# Patient Record
Sex: FEMALE | Race: BLACK OR AFRICAN AMERICAN | NOT HISPANIC OR LATINO | Employment: FULL TIME | URBAN - METROPOLITAN AREA
[De-identification: names, ages, dates, MRNs, and addresses within clinical notes are randomized per-mention and may not be internally consistent; named-entity substitution may affect disease eponyms.]

---

## 2021-05-05 ENCOUNTER — HOSPITAL ENCOUNTER (EMERGENCY)
Facility: HOSPITAL | Age: 21
Discharge: HOME | End: 2021-05-05
Attending: EMERGENCY MEDICINE

## 2021-05-05 VITALS
WEIGHT: 150.25 LBS | SYSTOLIC BLOOD PRESSURE: 150 MMHG | DIASTOLIC BLOOD PRESSURE: 82 MMHG | OXYGEN SATURATION: 100 % | BODY MASS INDEX: 29.5 KG/M2 | HEIGHT: 60 IN | HEART RATE: 99 BPM | TEMPERATURE: 99 F | RESPIRATION RATE: 18 BRPM

## 2021-05-05 DIAGNOSIS — J04.0 LARYNGITIS: ICD-10-CM

## 2021-05-05 DIAGNOSIS — J06.9 VIRAL URI WITH COUGH: Primary | ICD-10-CM

## 2021-05-05 PROCEDURE — 99284 EMERGENCY DEPT VISIT MOD MDM: CPT

## 2021-05-05 RX ORDER — METHYLPREDNISOLONE 4 MG/1
TABLET ORAL
Qty: 1 PACKAGE | Refills: 0 | Status: SHIPPED | OUTPATIENT
Start: 2021-05-05 | End: 2021-05-26

## 2021-05-05 RX ORDER — GUAIFENESIN/DEXTROMETHORPHAN 100-10MG/5
5 SYRUP ORAL 4 TIMES DAILY PRN
Qty: 120 ML | Refills: 0 | Status: SHIPPED | OUTPATIENT
Start: 2021-05-05 | End: 2021-05-15

## 2022-08-19 ENCOUNTER — HOSPITAL ENCOUNTER (EMERGENCY)
Facility: HOSPITAL | Age: 22
Discharge: HOME | End: 2022-08-19
Attending: EMERGENCY MEDICINE

## 2022-08-19 VITALS
HEIGHT: 66 IN | HEART RATE: 69 BPM | SYSTOLIC BLOOD PRESSURE: 118 MMHG | WEIGHT: 140 LBS | OXYGEN SATURATION: 96 % | RESPIRATION RATE: 16 BRPM | BODY MASS INDEX: 22.5 KG/M2 | DIASTOLIC BLOOD PRESSURE: 61 MMHG | TEMPERATURE: 99 F

## 2022-08-19 DIAGNOSIS — S81.811A LACERATION OF RIGHT LOWER EXTREMITY, INITIAL ENCOUNTER: Primary | ICD-10-CM

## 2022-08-19 LAB
HCV AB SERPL QL IA: NEGATIVE
HEP C VIRUS HOLD SPECIMEN: NORMAL
HIV 1+2 AB+HIV1 P24 AG SERPL QL IA: NEGATIVE

## 2022-08-19 PROCEDURE — 25000003 PHARM REV CODE 250: Performed by: NURSE PRACTITIONER

## 2022-08-19 PROCEDURE — 90715 TDAP VACCINE 7 YRS/> IM: CPT | Performed by: NURSE PRACTITIONER

## 2022-08-19 PROCEDURE — 12002 RPR S/N/AX/GEN/TRNK2.6-7.5CM: CPT

## 2022-08-19 PROCEDURE — 99283 EMERGENCY DEPT VISIT LOW MDM: CPT | Mod: 25

## 2022-08-19 PROCEDURE — 63600175 PHARM REV CODE 636 W HCPCS: Performed by: NURSE PRACTITIONER

## 2022-08-19 PROCEDURE — 86803 HEPATITIS C AB TEST: CPT | Performed by: NURSE PRACTITIONER

## 2022-08-19 PROCEDURE — 87389 HIV-1 AG W/HIV-1&-2 AB AG IA: CPT | Performed by: NURSE PRACTITIONER

## 2022-08-19 PROCEDURE — 90471 IMMUNIZATION ADMIN: CPT | Performed by: NURSE PRACTITIONER

## 2022-08-19 RX ORDER — LIDOCAINE HYDROCHLORIDE 10 MG/ML
1 INJECTION, SOLUTION EPIDURAL; INFILTRATION; INTRACAUDAL; PERINEURAL
Status: COMPLETED | OUTPATIENT
Start: 2022-08-19 | End: 2022-08-19

## 2022-08-19 RX ADMIN — LIDOCAINE HYDROCHLORIDE 10 MG: 10 INJECTION, SOLUTION EPIDURAL; INFILTRATION; INTRACAUDAL; PERINEURAL at 12:08

## 2022-08-19 RX ADMIN — TETANUS TOXOID, REDUCED DIPHTHERIA TOXOID AND ACELLULAR PERTUSSIS VACCINE, ADSORBED 0.5 ML: 5; 2.5; 8; 8; 2.5 SUSPENSION INTRAMUSCULAR at 12:08

## 2022-08-19 NOTE — ED NOTES
4x4 gauze and ace bandage applied over stitches on right leg. Pt refused neosporin stating she would pick some up on the way home. Pt educated on keeping wound clean and dry and replacing bandage daily or when visibly soiled. Pt verbalized understanding, mother at bedside

## 2022-08-19 NOTE — ED PROVIDER NOTES
Encounter Date: 8/19/2022       History     Chief Complaint   Patient presents with    Laceration     Laceration to right upper leg. Pt reports she accidentally cut her leg at work with a knife while cutting a rug. Bleeding controlled.      Patient presents to ER for laceration to right upper leg, onset just prior to arrival.  Patient states she was cutting carpet with a clean knife (she reports changing to a new blade just prior to the incident).  She is unsure of her last tetanus vaccine.  She denies numbness, tingling.  Bleeding is controlled.      Laceration   The incident occurred just prior to arrival. The laceration is located on the right leg. The laceration mechanism was a a clean knife. The pain has been constant since onset. She reports no foreign bodies present. Her tetanus status is unknown.     Review of patient's allergies indicates:  No Known Allergies  No past medical history on file.  No past surgical history on file.  No family history on file.  Social History     Tobacco Use    Smoking status: Unknown If Ever Smoked   Substance Use Topics    Alcohol use: Not Currently    Drug use: Not Currently     Review of Systems   Constitutional: Negative for chills, fatigue and fever.   HENT: Negative for ear pain, rhinorrhea, sinus pain and sore throat.    Eyes: Negative for pain.   Respiratory: Negative for cough and shortness of breath.    Cardiovascular: Negative for chest pain and palpitations.   Gastrointestinal: Negative for abdominal pain, nausea and vomiting.   Genitourinary: Negative for dysuria.   Musculoskeletal: Negative for back pain, myalgias and neck pain.   Skin: Positive for wound. Negative for rash.   Neurological: Negative for weakness, numbness and headaches.   All other systems reviewed and are negative.      Physical Exam     Initial Vitals [08/19/22 1134]   BP Pulse Resp Temp SpO2   106/72 62 16 98.6 °F (37 °C) 95 %      MAP       --         Physical Exam    Nursing note and  vitals reviewed.  Constitutional: She appears well-developed and well-nourished. She is not diaphoretic. No distress.   HENT:   Head: Normocephalic and atraumatic.   Eyes: Conjunctivae and EOM are normal. Pupils are equal, round, and reactive to light.   Neck: Neck supple.   Normal range of motion.  Cardiovascular: Normal rate, regular rhythm and intact distal pulses.   Pulmonary/Chest: Breath sounds normal. No respiratory distress.   Musculoskeletal:         General: Normal range of motion.      Cervical back: Normal range of motion and neck supple.        Legs:      Neurological: She is alert and oriented to person, place, and time. She has normal strength. No sensory deficit. GCS score is 15. GCS eye subscore is 4. GCS verbal subscore is 5. GCS motor subscore is 6.   Skin: Skin is warm. Capillary refill takes less than 2 seconds.   + 4.5cm laceration to right lateral upper leg. No active bleeding. Distal sensation intact.         ED Course   Lac Repair    Date/Time: 8/19/2022 1:17 PM  Performed by: Deacon Flowers NP  Authorized by: Amor Joya MD     Consent:     Consent obtained:  Verbal    Consent given by:  Patient    Risks, benefits, and alternatives were discussed: yes      Risks discussed:  Infection, pain, poor cosmetic result and poor wound healing    Alternatives discussed:  No treatment  Anesthesia:     Anesthesia method:  Local infiltration    Local anesthetic:  Lidocaine 1% w/o epi  Laceration details:     Location:  Leg    Leg location:  R upper leg    Length (cm):  4.5  Pre-procedure details:     Preparation:  Patient was prepped and draped in usual sterile fashion  Exploration:     Wound exploration: entire depth of wound visualized      Wound extent: no foreign bodies/material noted      Contaminated: no    Treatment:     Area cleansed with:  Povidone-iodine and saline    Amount of cleaning:  Standard    Irrigation solution:  Sterile saline    Irrigation method:  Syringe  Skin repair:      Repair method:  Sutures    Suture size:  4-0    Suture material:  Nylon    Suture technique:  Simple interrupted    Number of sutures:  8  Approximation:     Approximation:  Close  Repair type:     Repair type:  Simple  Post-procedure details:     Dressing:  Antibiotic ointment and non-adherent dressing    Procedure completion:  Tolerated well, no immediate complications      Labs Reviewed   HIV 1 / 2 ANTIBODY    Narrative:     Release to patient->Immediate   HEPATITIS C ANTIBODY    Narrative:     Release to patient->Immediate   HEP C VIRUS HOLD SPECIMEN    Narrative:     Release to patient->Immediate          Imaging Results    None          Medications   neomycin-bacitracin-polymyxin ointment ( Topical (Top) Not Given 8/19/22 1200)   LIDOcaine (PF) 10 mg/ml (1%) injection 10 mg (10 mg Other Given by Provider 8/19/22 1200)   Tdap (BOOSTRIX) vaccine injection 0.5 mL (0.5 mLs Intramuscular Given 8/19/22 1200)       patient tolerated laceration repair without complication.  Instructed patient to follow-up with PCP for suture removal in 8-10 days.  Discussed proper wound care at home.  Discussed signs and symptoms to return to ER.  Patient verbalizes understanding and agrees with plan.  Patient states comfortable discharge home.  Regarding LACERATION CARE, patient was instructed to wash hands with soap and warm water before and after caring for wound; keep the wound dry for the first 24 to 48 hours and then gently clean the wound once or twice a day with cool water using soap to clean around the wound; avoid using alcohol or hydrogen peroxide to clean wound unless directed to; and use bandages to keep wound clean and protected and to prevent swelling.  Advised patient to contact primary healthcare provider if wound splits open; becomes extremely painful; appears to not be healing; has a foreign object present; develop a purulent discharge; or note the skin around the wound becoming numb, edematous, or erythematous.   Patient instructed to follow up with primary care provider for wound re-check or closure removal in 8-10 days.                      Clinical Impression:   Final diagnoses:  [S82.460S] Laceration of right lower extremity, initial encounter (Primary)          ED Disposition Condition    Discharge Stable        ED Prescriptions     Medication Sig Dispense Start Date End Date Auth. Provider    neomycin-polymyxin-pramoxine (NEOSPORIN) 3.5-10,000-10 mg-unit-mg/gram Crea Apply topically 2 (two) times daily. for 10 days 1 each 8/19/2022 8/29/2022 Deacon Flowers NP        Follow-up Information     Follow up With Specialties Details Why Contact Info    Follow-up with your PCP for suture removal in 8-10 days.   For suture removal     [facility] - Emergency Dept. Emergency Medicine  If symptoms worsen, As needed [address] [facility] [address] [phone]           Deacon Flowers NP  08/19/22 2909

## 2022-08-29 ENCOUNTER — HOSPITAL ENCOUNTER (EMERGENCY)
Facility: HOSPITAL | Age: 22
Discharge: HOME | End: 2022-08-29
Attending: EMERGENCY MEDICINE

## 2022-08-29 VITALS
RESPIRATION RATE: 15 BRPM | HEART RATE: 86 BPM | DIASTOLIC BLOOD PRESSURE: 63 MMHG | SYSTOLIC BLOOD PRESSURE: 127 MMHG | TEMPERATURE: 98 F | WEIGHT: 131.38 LBS | BODY MASS INDEX: 21.21 KG/M2 | OXYGEN SATURATION: 98 %

## 2022-08-29 DIAGNOSIS — S81.811D LACERATION OF RIGHT LOWER EXTREMITY, SUBSEQUENT ENCOUNTER: Primary | ICD-10-CM

## 2022-08-29 PROCEDURE — 99281 EMR DPT VST MAYX REQ PHY/QHP: CPT

## 2022-08-29 NOTE — Clinical Note
"Radha "Mikayla Desouza was seen and treated in our emergency department on 8/29/2022.  She may return to work on 09/05/2022.       If you have any questions or concerns, please don't hesitate to call.      Syd Walker MD"

## 2022-08-29 NOTE — ED PROVIDER NOTES
SCRIBE #1 NOTE: I, Hector Varner, am scribing for, and in the presence of, Syd Walker MD. I have scribed the entire note.      History      Chief Complaint   Patient presents with    Suture / Staple Removal     Received stitches on 8/19/22. Wants to know if they can be taken out today. No complaints.        Review of patient's allergies indicates:  No Known Allergies     HPI   HPI    8/29/2022, 10:23 AM   History obtained from the patient      History of Present Illness: Radha Desouza is a 22 y.o. female patient who presents to the Emergency Department for suture removal. Pt had 8 sutures placed on 8/19/22 to repair a RLE laceration. Pt denies any complaints at this time. No mitigating or exacerbating factors reported. No associated sxs reported. Patient denies any fever, chills, n/v/d, SOB, CP, weakness, numbness, dizziness, headache, and all other sxs at this time. No further complaints or concerns at this time.     Arrival mode: Personal vehicle    PCP: Primary Doctor No       Past Medical History:  No past medical history on file.    Past Surgical History:  No past surgical history on file.      Family History:  No family history on file.    Social History:  Social History     Tobacco Use    Smoking status: Unknown    Smokeless tobacco: Not on file   Substance and Sexual Activity    Alcohol use: Not Currently    Drug use: Not Currently    Sexual activity: Not on file       ROS   Review of Systems   Constitutional:  Negative for chills and fever.   HENT:  Negative for sore throat.    Respiratory:  Negative for shortness of breath.    Cardiovascular:  Negative for chest pain.   Gastrointestinal:  Negative for diarrhea, nausea and vomiting.   Genitourinary:  Negative for dysuria.   Musculoskeletal:  Negative for back pain.   Skin:  Negative for rash.   Neurological:  Negative for dizziness, weakness, light-headedness, numbness and headaches.   Hematological:  Does not bruise/bleed easily.   All other  systems reviewed and are negative.    Physical Exam      Initial Vitals   BP Pulse Resp Temp SpO2   08/29/22 1007 08/29/22 1007 08/29/22 1007 08/29/22 1008 08/29/22 1007   127/63 86 15 98.4 °F (36.9 °C) 98 %      MAP       --                 Physical Exam  Nursing Notes and Vital Signs Reviewed.  Constitutional: Patient is in no acute distress. Well-developed and well-nourished.  Head: Atraumatic. Normocephalic.  Eyes: PERRL. EOM intact. Conjunctivae are not pale. No scleral icterus.  ENT: Mucous membranes are moist. Oropharynx is clear and symmetric.    Neck: Supple. Full ROM.   Cardiovascular: Regular rate. Regular rhythm. No murmurs, rubs, or gallops. Distal pulses are 2+ and symmetric.  Pulmonary/Chest: No respiratory distress. Clear to auscultation bilaterally. No wheezing or rales.  Abdominal: Soft and non-distended.  There is no tenderness.  No rebound, guarding, or rigidity.   Musculoskeletal: Moves all extremities. No obvious deformities. No edema.  Skin: Warm and dry. Well-healed laceration to the R upper leg, with sutures in place.  Neurological:  Alert, awake, and appropriate.  Normal speech.  No acute focal neurological deficits are appreciated.  Psychiatric: Normal affect. Good eye contact. Appropriate in content.    ED Course    Suture Removal    Date/Time: 8/29/2022 10:25 AM  Location procedure was performed: [facility] EMERGENCY DEPARTMENT  Performed by: Syd Walker MD  Authorized by: Syd Walker MD   Body area: lower extremity  Location details: right upper leg  Wound Appearance: clean, well healed, nontender and no drainage  Sutures Removed: 8  Post-removal: Steri-Strips applied  Facility: sutures placed in this facility  Complications: No  Specimens: No  Implants: No  Patient tolerance: Patient tolerated the procedure well with no immediate complications      ED Vital Signs:  Vitals:    08/29/22 1007 08/29/22 1008   BP: 127/63    Pulse: 86    Resp: 15    Temp:  98.4 °F (36.9 °C)   SpO2:  98%    Weight: 59.6 kg (131 lb 6.3 oz)        Abnormal Lab Results:  Labs Reviewed - No data to display     Imaging Results:  Imaging Results    None                 The Emergency Provider reviewed the vital signs and test results, which are outlined above.    ED Discussion     10:26 AM: Reassessed pt at this time. Discussed with pt all pertinent ED information. Discussed pt dx and plan of tx. Gave pt all f/u and return to the ED instructions. All questions and concerns were addressed at this time. Pt expresses understanding of information and instructions, and is comfortable with plan to discharge. Pt is stable for discharge.    I discussed with patient and/or family/caretaker that evaluation in the ED does not suggest any emergent or life threatening medical conditions requiring immediate intervention beyond what was provided in the ED, and I believe patient is safe for discharge.  Regardless, an unremarkable evaluation in the ED does not preclude the development or presence of a serious of life threatening condition. As such, patient was instructed to return immediately for any worsening or change in current symptoms.         ED Medication(s):  Medications - No data to display     Follow-up Information       [facility] In 2 days.    Contact information:  [address] [facility] [address] [phone]                           Discharge Medication List as of 8/29/2022 10:24 AM            Medical Decision Making              Scribe Attestation:   Scribe #1: I performed the above scribed service and the documentation accurately describes the services I performed. I attest to the accuracy of the note.    Attending:   Physician Attestation Statement for Scribe #1: I, Syd Walker MD, personally performed the services described in this documentation, as scribed by Hector Varner, in my presence, and it is both accurate and complete.          Clinical Impression       ICD-10-CM ICD-9-CM   1.  Laceration of right lower extremity, subsequent encounter  S81.811D V58.89     894.0       Disposition:   Disposition: Discharged  Condition: Stable       Syd Walker MD  08/29/22 1143

## 2022-11-28 ENCOUNTER — OFFICE VISIT (OUTPATIENT)
Dept: URGENT CARE | Facility: CLINIC | Age: 22
End: 2022-11-28

## 2022-11-28 VITALS
RESPIRATION RATE: 18 BRPM | SYSTOLIC BLOOD PRESSURE: 118 MMHG | OXYGEN SATURATION: 98 % | HEART RATE: 76 BPM | BODY MASS INDEX: 21.05 KG/M2 | DIASTOLIC BLOOD PRESSURE: 64 MMHG | WEIGHT: 131 LBS | TEMPERATURE: 98 F | HEIGHT: 66 IN

## 2022-11-28 DIAGNOSIS — J02.9 SORE THROAT: ICD-10-CM

## 2022-11-28 DIAGNOSIS — J06.9 VIRAL URI WITH COUGH: Primary | ICD-10-CM

## 2022-11-28 LAB
CTP QC/QA: YES
POC MOLECULAR INFLUENZA A AGN: NEGATIVE
POC MOLECULAR INFLUENZA B AGN: NEGATIVE

## 2022-11-28 PROCEDURE — 87502 POCT INFLUENZA A/B MOLECULAR: ICD-10-PCS | Mod: QW,TIER,S$GLB, | Performed by: NURSE PRACTITIONER

## 2022-11-28 PROCEDURE — 99203 PR OFFICE/OUTPT VISIT, NEW, LEVL III, 30-44 MIN: ICD-10-PCS | Mod: TIER,S$GLB,, | Performed by: NURSE PRACTITIONER

## 2022-11-28 PROCEDURE — 87502 INFLUENZA DNA AMP PROBE: CPT | Mod: QW,TIER,S$GLB, | Performed by: NURSE PRACTITIONER

## 2022-11-28 PROCEDURE — 99203 OFFICE O/P NEW LOW 30 MIN: CPT | Mod: TIER,S$GLB,, | Performed by: NURSE PRACTITIONER

## 2022-11-28 NOTE — LETTER
November 28, 2022      [facility]  [address], [address]  [place] [address]  Phone: [phone]  Fax: [phone]       Patient: Radha Desouza   YOB: 2000  Date of Visit: 11/28/2022    To Whom It May Concern:    Nadege Desuoza  was at [facility] on 11/28/2022. The patient may return to work/school on 11/29/2022 with no restrictions. If you have any questions or concerns, or if I can be of further assistance, please do not hesitate to contact me.    Sincerely,        Oliver Duran, NP

## 2022-11-29 NOTE — PROGRESS NOTES
"Subjective:       Patient ID: Radha Desouza is a 22 y.o. female.    Vitals:  height is 5' 6" (1.676 m) and weight is 59.4 kg (131 lb). Her tympanic temperature is 98.2 °F (36.8 °C). Her blood pressure is 118/64 and her pulse is 76. Her respiration is 18 and oxygen saturation is 98%.     Chief Complaint: Sore Throat    Pt presents today with sore throat and cough. Pt states that her symptoms started 2-3 days ago. Pt states that she has taken OTC medication for her symptoms.    Sore Throat   This is a new problem. The current episode started in the past 7 days. The problem has been gradually worsening. There has been no fever. The pain is at a severity of 4/10. The pain is moderate. Associated symptoms include congestion and swollen glands. Pertinent negatives include no abdominal pain, coughing, diarrhea, drooling, ear discharge, ear pain, headaches, hoarse voice, plugged ear sensation, neck pain, shortness of breath, stridor, trouble swallowing or vomiting. She has tried acetaminophen (oral decongestants) for the symptoms. The treatment provided mild relief.     HENT:  Positive for congestion and sore throat. Negative for ear pain, ear discharge, drooling and trouble swallowing.    Neck: Negative for neck pain.   Respiratory:  Negative for cough, shortness of breath and stridor.    Gastrointestinal:  Negative for abdominal pain, vomiting and diarrhea.   Neurological:  Negative for headaches.     Objective:      Physical Exam   Constitutional: She is oriented to person, place, and time. She appears well-developed. She is cooperative.  Non-toxic appearance. She does not appear ill. No distress.   HENT:   Head: Normocephalic and atraumatic.   Ears:   Right Ear: Hearing, tympanic membrane, external ear and ear canal normal. No middle ear effusion.   Left Ear: Hearing, tympanic membrane, external ear and ear canal normal.  No middle ear effusion.   Nose: Rhinorrhea present. No mucosal edema or nasal deformity. No " epistaxis. Right sinus exhibits no maxillary sinus tenderness and no frontal sinus tenderness. Left sinus exhibits no maxillary sinus tenderness and no frontal sinus tenderness.   Mouth/Throat: Uvula is midline, oropharynx is clear and moist and mucous membranes are normal. No trismus in the jaw. Normal dentition. No uvula swelling. Cobblestoning present. No oropharyngeal exudate, posterior oropharyngeal edema or posterior oropharyngeal erythema.   Eyes: Conjunctivae and lids are normal. No scleral icterus.   Neck: Trachea normal and phonation normal. Neck supple. No edema present. No erythema present. No neck rigidity present.   Cardiovascular: Normal rate, regular rhythm, normal heart sounds and normal pulses.   Pulmonary/Chest: Effort normal and breath sounds normal. No respiratory distress. She has no decreased breath sounds. She has no wheezes. She has no rhonchi.   Abdominal: Normal appearance.   Musculoskeletal: Normal range of motion.         General: No deformity. Normal range of motion.   Neurological: She is alert and oriented to person, place, and time. She exhibits normal muscle tone. Coordination normal.   Skin: Skin is warm, dry, intact, not diaphoretic and not pale.   Psychiatric: Her speech is normal and behavior is normal. Judgment and thought content normal.   Nursing note and vitals reviewed.      Assessment:       1. Viral URI with cough    2. Sore throat            Plan:         Viral URI with cough  -     POCT Influenza A/B MOLECULAR    Sore throat               Results for orders placed or performed in visit on 11/28/22   POCT Influenza A/B MOLECULAR   Result Value Ref Range    POC Molecular Influenza A Ag Negative Negative, Not Reported    POC Molecular Influenza B Ag Negative Negative, Not Reported     Acceptable Yes      Lab result reviewed and discussed with patient.    Get plenty of rest.  Increase fluids.   May apply warm compresses as needed for facial pain and  congestion.   Saline nasal spray to loosen nasal congestion.  Humidifier or steamy shower may help with congestion.  Flonase or Nasacort to reduce inflammation in the sinus cavities.  You may take an over the counter 24 hour antihistamine such as Zyrtec or Claritin for allergy symptoms such as sneezing, itchy/watery eyes, scratchy throat, or congestion.  Warm salt water gargles, Cepacol throat lozenges, or Chloraseptic spray for sore throat.  Take Tylenol or Ibuprofen as needed for sore throat, body aches, or fever.  Take an over the counter cough suppressant such as Delsym or Robitussin DM as directed on label for cough.  You may take a multi-symptom medication in the place of cough suppressant such as Dayquil/Nyquil or Advil Cold and Flu as directed on label.  Follow up with your primary care provider if symptoms persist >10 days or sooner for any new or worsening symptoms.   Go to the ER for any fever that does not improve with Tylenol/Ibuprofen, neck stiffness, rash, severe headache, vision changes, shortness of breath, chest pain, facial swelling, severe facial pain, or any other new and concerning symptoms.

## 2022-11-29 NOTE — PATIENT INSTRUCTIONS
Get plenty of rest.  Increase fluids.   May apply warm compresses as needed for facial pain and congestion.   Saline nasal spray to loosen nasal congestion.  Humidifier or steamy shower may help with congestion.  Flonase or Nasacort to reduce inflammation in the sinus cavities.  You may take an over the counter 24 hour antihistamine such as Zyrtec or Claritin for allergy symptoms such as sneezing, itchy/watery eyes, scratchy throat, or congestion.  Warm salt water gargles, Cepacol throat lozenges, or Chloraseptic spray for sore throat.  Take Tylenol or Ibuprofen as needed for sore throat, body aches, or fever.  Take an over the counter cough suppressant such as Delsym or Robitussin DM as directed on label for cough.  You may take a multi-symptom medication in the place of cough suppressant such as Dayquil/Nyquil or Advil Cold and Flu as directed on label.  Follow up with your primary care provider if symptoms persist >10 days or sooner for any new or worsening symptoms.   Go to the ER for any fever that does not improve with Tylenol/Ibuprofen, neck stiffness, rash, severe headache, vision changes, shortness of breath, chest pain, facial swelling, severe facial pain, or any other new and concerning symptoms.

## 2022-12-04 ENCOUNTER — HOSPITAL ENCOUNTER (EMERGENCY)
Facility: HOSPITAL | Age: 22
Discharge: HOME | End: 2022-12-05
Attending: EMERGENCY MEDICINE

## 2022-12-04 VITALS
WEIGHT: 138.88 LBS | SYSTOLIC BLOOD PRESSURE: 125 MMHG | HEIGHT: 66 IN | DIASTOLIC BLOOD PRESSURE: 90 MMHG | HEART RATE: 102 BPM | OXYGEN SATURATION: 100 % | TEMPERATURE: 99 F | RESPIRATION RATE: 20 BRPM | BODY MASS INDEX: 22.32 KG/M2

## 2022-12-04 DIAGNOSIS — R05.9 COUGH: ICD-10-CM

## 2022-12-04 DIAGNOSIS — J20.9 ACUTE BRONCHITIS, UNSPECIFIED ORGANISM: Primary | ICD-10-CM

## 2022-12-04 LAB
INFLUENZA A, MOLECULAR: NEGATIVE
INFLUENZA B, MOLECULAR: NEGATIVE
SARS-COV-2 RDRP RESP QL NAA+PROBE: NEGATIVE
SPECIMEN SOURCE: NORMAL

## 2022-12-04 PROCEDURE — 25000003 PHARM REV CODE 250: Performed by: NURSE PRACTITIONER

## 2022-12-04 PROCEDURE — U0002 COVID-19 LAB TEST NON-CDC: HCPCS | Performed by: EMERGENCY MEDICINE

## 2022-12-04 PROCEDURE — 87502 INFLUENZA DNA AMP PROBE: CPT | Performed by: EMERGENCY MEDICINE

## 2022-12-04 RX ORDER — DEXAMETHASONE SODIUM PHOSPHATE 4 MG/ML
10 INJECTION, SOLUTION INTRA-ARTICULAR; INTRALESIONAL; INTRAMUSCULAR; INTRAVENOUS; SOFT TISSUE
Status: COMPLETED | OUTPATIENT
Start: 2022-12-05 | End: 2022-12-05

## 2022-12-04 RX ORDER — PROMETHAZINE HYDROCHLORIDE AND DEXTROMETHORPHAN HYDROBROMIDE 6.25; 15 MG/5ML; MG/5ML
5 SYRUP ORAL 3 TIMES DAILY PRN
Qty: 180 ML | Refills: 0 | Status: SHIPPED | OUTPATIENT
Start: 2022-12-04 | End: 2022-12-14

## 2022-12-04 RX ORDER — PROMETHAZINE HYDROCHLORIDE AND CODEINE PHOSPHATE 6.25; 1 MG/5ML; MG/5ML
5 SOLUTION ORAL
Status: COMPLETED | OUTPATIENT
Start: 2022-12-04 | End: 2022-12-04

## 2022-12-04 RX ADMIN — PROMETHAZINE HYDROCHLORIDE AND CODEINE PHOSPHATE 5 ML: 6.25; 1 SOLUTION ORAL at 11:12

## 2022-12-05 PROCEDURE — 99284 EMERGENCY DEPT VISIT MOD MDM: CPT

## 2022-12-05 PROCEDURE — 63600175 PHARM REV CODE 636 W HCPCS: Performed by: NURSE PRACTITIONER

## 2022-12-05 PROCEDURE — 96372 THER/PROPH/DIAG INJ SC/IM: CPT | Performed by: NURSE PRACTITIONER

## 2022-12-05 RX ADMIN — DEXAMETHASONE SODIUM PHOSPHATE 10 MG: 4 INJECTION INTRA-ARTICULAR; INTRALESIONAL; INTRAMUSCULAR; INTRAVENOUS; SOFT TISSUE at 12:12

## 2022-12-05 NOTE — ED PROVIDER NOTES
HISTORY     Chief Complaint   Patient presents with    Cough     Pt presents to ED [facility] [place] persistant cough (non-productive). Seen at  on 11/27 and DX URI. Pt on no RX meds. Denies any other flu like S/S     Review of patient's allergies indicates:  No Known Allergies     HPI   The history is provided by the patient and a parent.   Cough  This is a new problem. The current episode started several days ago. The problem occurs constantly. The problem has been gradually worsening. The cough is Non-productive. Pertinent negatives include no chest pain, no sore throat and no shortness of breath. The treatment provided no relief. She is not a smoker.      PCP: Primary Doctor No     Past Medical History:  No past medical history on file.     Past Surgical History:  No past surgical history on file.     Family History:  No family history on file.     Social History:  Social History     Tobacco Use    Smoking status: Unknown    Smokeless tobacco: Not on file   Substance and Sexual Activity    Alcohol use: Not Currently    Drug use: Not Currently    Sexual activity: Not Currently         ROS   Review of Systems   Constitutional:  Negative for fever.   HENT:  Negative for sore throat.    Respiratory:  Positive for cough. Negative for shortness of breath.    Cardiovascular:  Negative for chest pain.   Gastrointestinal:  Negative for nausea.   Genitourinary:  Negative for dysuria.   Musculoskeletal:  Negative for back pain.   Skin:  Negative for rash.   Neurological:  Negative for weakness.   Hematological:  Does not bruise/bleed easily.     PHYSICAL EXAM     Initial Vitals [12/04/22 2236]   BP Pulse Resp Temp SpO2   (!) 125/90 102 18 98.5 °F (36.9 °C) 100 %      MAP       --           Physical Exam    Constitutional: She appears well-developed and well-nourished. No distress.   HENT:   Head: Normocephalic and atraumatic.   Eyes: Conjunctivae are normal. Pupils are equal, round, and reactive to light.   Neck: Neck  "supple.   Normal range of motion.  Cardiovascular:  Normal rate, regular rhythm and normal heart sounds.           Pulmonary/Chest: Breath sounds normal.   Dry cough   Abdominal: Abdomen is soft. Bowel sounds are normal. She exhibits no distension. There is no abdominal tenderness. There is no rebound.   Musculoskeletal:         General: No edema. Normal range of motion.      Cervical back: Normal range of motion and neck supple.     Neurological: She is alert and oriented to person, place, and time. She has normal strength.   Skin: Skin is warm and dry.   Psychiatric: She has a normal mood and affect.        ED COURSE   Procedures  ED ONGOING VITALS:  Vitals:    12/04/22 2236 12/04/22 2342   BP: (!) 125/90    Pulse: 102    Resp: 18 20   Temp: 98.5 °F (36.9 °C)    TempSrc: Oral    SpO2: 100%    Weight: 63 kg (138 lb 14.2 oz)    Height: 5' 6" (1.676 m)          ABNORMAL LAB VALUES:  Labs Reviewed   INFLUENZA A & B BY MOLECULAR   SARS-COV-2 RNA AMPLIFICATION, QUAL         ALL LAB VALUES:  Results for orders placed or performed during the hospital encounter of 12/04/22   Influenza A & B by Molecular    Specimen: Nasopharyngeal Swab   Result Value Ref Range    Influenza A, Molecular Negative Negative    Influenza B, Molecular Negative Negative    Flu A & B Source Nasal swab    COVID-19 Rapid Screening   Result Value Ref Range    SARS-CoV-2 RNA, Amplification, Qual Negative Negative           RADIOLOGY STUDIES:  Imaging Results              X-Ray Chest 1 View (Final result)  Result time 12/04/22 23:31:40      Final result by Chase Lazcano MD (12/04/22 23:31:40)                   Impression:      No acute abnormality.      Electronically signed by: Chase Lazcano  Date:    12/04/2022  Time:    23:31               Narrative:    EXAMINATION:  XR CHEST 1 VIEW    CLINICAL HISTORY:  Cough, unspecified    TECHNIQUE:  Single frontal view of the chest was performed.    COMPARISON:  None    FINDINGS:  The lungs are clear, with " normal appearance of pulmonary vasculature and no pleural effusion or pneumothorax.    The cardiac silhouette is normal in size. The hilar and mediastinal contours are unremarkable.    Bones are intact.                                                The above vital signs and test results have been reviewed by the emergency provider.     ED Medications:  Discharge Medication List as of 12/4/2022 11:57 PM        START taking these medications    Details   promethazine-dextromethorphan (PROMETHAZINE-DM) 6.25-15 mg/5 mL Syrp Take 5 mLs by mouth 3 (three) times daily as needed., Starting Sun 12/4/2022, Until Wed 12/14/2022 at 2359, Print           Discharge Medications:  Discharge Medication List as of 12/4/2022 11:57 PM        START taking these medications    Details   promethazine-dextromethorphan (PROMETHAZINE-DM) 6.25-15 mg/5 mL Syrp Take 5 mLs by mouth 3 (three) times daily as needed., Starting Sun 12/4/2022, Until Wed 12/14/2022 at 2359, Print             Follow-up Information       Schedule an appointment as soon as possible for a visit  with PCP.               [facility] - Emergency Dept..    Specialty: Emergency Medicine  Contact information:  [id number] [place] [id number] [phone]                          I discussed with patient and/or family/caretaker that evaluation in the ED does not suggest any emergent or life threatening medical conditions requiring immediate intervention beyond what was provided in the ED, and I believe patient is safe for discharge. Regardless, an unremarkable evaluation in the ED does not preclude the development or presence of a serious or life threatening condition. As such, patient was instructed to return immediately for any worsening or change in current symptoms.    Regarding BRONCHITIS, for treatment, I encouraged patient to refrain from smoking, drink plenty of fluids, rest, take medications as prescribed, and to use a humidifier or steam in  the bathroom. Patient instructed to notify primary care provider if: they have a cough most days or have a cough that returns frequently; are coughing up blood; have a high fever or shaking chills; have a low-grade fever for three or more days; develop thick, greenish mucus, especially if it has a bad smell; and feel short of breath or have chest pain. For prevention, discussed with patient the importance of not smoking, getting annual influenza vaccines, reducing exposure to air pollution, and to frequently wash hands to avoid spread of infection.  Instructed patient to return to emergency department if they experience chest pain or shortness of breath and to follow up with primary care provider for re-evaluation.        MEDICAL DECISION MAKING                 CLINICAL IMPRESSION       ICD-10-CM ICD-9-CM   1. Acute bronchitis, unspecified organism  J20.9 466.0   2. Cough  R05.9 786.2       Disposition:   Disposition: Discharged  Condition: Stable       Eduard Stein NP  12/05/22 1400

## 2022-12-14 ENCOUNTER — HOSPITAL ENCOUNTER (EMERGENCY)
Facility: HOSPITAL | Age: 22
Discharge: HOME | End: 2022-12-14
Attending: FAMILY MEDICINE

## 2022-12-14 VITALS
WEIGHT: 138.56 LBS | BODY MASS INDEX: 22.36 KG/M2 | TEMPERATURE: 98 F | RESPIRATION RATE: 18 BRPM | DIASTOLIC BLOOD PRESSURE: 75 MMHG | OXYGEN SATURATION: 98 % | HEART RATE: 70 BPM | SYSTOLIC BLOOD PRESSURE: 140 MMHG

## 2022-12-14 DIAGNOSIS — R10.9 ABDOMINAL CRAMPING: Primary | ICD-10-CM

## 2022-12-14 LAB
B-HCG UR QL: NEGATIVE
BILIRUB UR QL STRIP: NEGATIVE
CLARITY UR: CLEAR
COLOR UR: YELLOW
GLUCOSE UR QL STRIP: NEGATIVE
HGB UR QL STRIP: NEGATIVE
KETONES UR QL STRIP: NEGATIVE
LEUKOCYTE ESTERASE UR QL STRIP: ABNORMAL
MICROSCOPIC COMMENT: NORMAL
NITRITE UR QL STRIP: NEGATIVE
PH UR STRIP: 7 [PH] (ref 5–8)
PROT UR QL STRIP: NEGATIVE
RBC #/AREA URNS HPF: 3 /HPF (ref 0–4)
SP GR UR STRIP: 1.01 (ref 1–1.03)
SQUAMOUS #/AREA URNS HPF: 1 /HPF
URN SPEC COLLECT METH UR: ABNORMAL
UROBILINOGEN UR STRIP-ACNC: NEGATIVE EU/DL
WBC #/AREA URNS HPF: 1 /HPF (ref 0–5)
WBC CLUMPS URNS QL MICRO: NORMAL

## 2022-12-14 PROCEDURE — 99282 EMERGENCY DEPT VISIT SF MDM: CPT | Mod: 25

## 2022-12-14 PROCEDURE — 81025 URINE PREGNANCY TEST: CPT | Performed by: REGISTERED NURSE

## 2022-12-14 PROCEDURE — 81000 URINALYSIS NONAUTO W/SCOPE: CPT | Performed by: REGISTERED NURSE

## 2022-12-14 NOTE — ED PROVIDER NOTES
Encounter Date: 12/14/2022       History     Chief Complaint   Patient presents with    Abdominal Cramping     Lower abd. Cramping a few days ago and spotting today. Concerns for a UTI or pregnancy from pt.      22-year-old female presents emergency department with complaints of lower abdominal cramping and vaginal spotting that began today.  Patient concerned she may have UTI or pregnant.  Patient denies any fever, chills, pelvic pain, dysuria or any other symptoms at this time.    The history is provided by the patient.   Review of patient's allergies indicates:  No Known Allergies  No past medical history on file.  No past surgical history on file.  No family history on file.  Social History     Tobacco Use    Smoking status: Unknown   Substance Use Topics    Alcohol use: Not Currently    Drug use: Not Currently     Review of Systems   Constitutional:  Negative for fever.   HENT:  Negative for sore throat.    Respiratory:  Negative for shortness of breath.    Cardiovascular:  Negative for chest pain.   Gastrointestinal:  Negative for nausea.        +abdominal cramping   Genitourinary:  Positive for vaginal bleeding. Negative for dysuria.   Musculoskeletal:  Negative for back pain.   Skin:  Negative for rash.   Neurological:  Negative for weakness.   Hematological:  Does not bruise/bleed easily.   All other systems reviewed and are negative.    Physical Exam     Initial Vitals [12/14/22 1717]   BP Pulse Resp Temp SpO2   (!) 146/86 71 18 98.7 °F (37.1 °C) 98 %      MAP       --         Physical Exam    Constitutional: She appears well-developed and well-nourished. She is not diaphoretic. No distress.   HENT:   Head: Normocephalic and atraumatic.   Eyes: Conjunctivae and EOM are normal. Pupils are equal, round, and reactive to light.   Neck: Neck supple.   Normal range of motion.  Cardiovascular:  Normal rate, regular rhythm and normal heart sounds.           No murmur heard.  Pulmonary/Chest: Breath sounds normal.  No respiratory distress. She has no wheezes. She has no rales.   Abdominal: Abdomen is soft. Bowel sounds are normal. There is no abdominal tenderness. There is no rebound and no guarding.   Musculoskeletal:         General: No tenderness or edema. Normal range of motion.      Cervical back: Normal range of motion and neck supple.     Neurological: She is alert and oriented to person, place, and time. No cranial nerve deficit. GCS score is 15. GCS eye subscore is 4. GCS verbal subscore is 5. GCS motor subscore is 6.   Skin: Skin is warm and dry. Capillary refill takes less than 2 seconds.   Psychiatric: She has a normal mood and affect. Thought content normal.       ED Course   Procedures  Labs Reviewed   URINALYSIS, REFLEX TO URINE CULTURE - Abnormal; Notable for the following components:       Result Value    Leukocytes, UA Trace (*)     All other components within normal limits    Narrative:     Specimen Source->Urine   PREGNANCY TEST, URINE RAPID    Narrative:     Specimen Source->Urine   URINALYSIS MICROSCOPIC    Narrative:     Specimen Source->Urine   HIV 1 / 2 ANTIBODY   HEPATITIS C ANTIBODY   HEP C VIRUS HOLD SPECIMEN          Imaging Results    None          Medications - No data to display                           Clinical Impression:   Final diagnoses:  [R10.9] Abdominal cramping (Primary)        ED Disposition Condition    Discharge Stable          ED Prescriptions    None       Follow-up Information       Follow up With Specialties Details Why Contact Info    [facility] - Emergency Dept. Emergency Medicine  As needed [id number] [facility] [id number] [phone]             Jeffery Sow Jr., FNP  12/14/22 5234

## 2022-12-20 ENCOUNTER — PATIENT OUTREACH (OUTPATIENT)
Dept: EMERGENCY MEDICINE | Facility: HOSPITAL | Age: 22
End: 2022-12-20

## 2022-12-20 NOTE — PROGRESS NOTES
Tracy Saucedo  ED Navigator  Emergency Department    Project: [facility] ED Navigator  Role: Community Health Worker    Date: 12/20/2022  Patient Name: Radha Desouza  MRN: [id number]  PCP: Primary Doctor No    Assessment:     Radha Desouza is a 22 y.o. female who has presented to ED for abdominal cramping. Patient has visited the ED 2 times in the past 3 months. Patient did not contact PCP.     ED Navigator Initial Assessment    ED Navigator Enrollment Documentation  Consent to Services  Does patient consent to completing the assessment?: Yes  Contact  Method of Initial Contact: Phone  Transportation  Does the patient have issues with Transportation?: No  Does the patient have transportation to and from healthcare appointments?: Yes  Insurance Coverage  Do you have coverage/adequate coverage?: No  Reason for no/inadequate coverage: Other (see comment) (Comment: Pt applied for insurance and new policy information has not arrived /BC/BS)  Specialist Appointment  Did the patient come to the ED to see a specialist?: No  Does the patient have a pending specialist referral?: No  Does the patient have a specialist appointment made?: No  PCP Follow Up Appointment  Has the patient had an appointment with a primary care provider in the past year?: No  Does the patient have a follow up appontment with a PCP?: No  When was the last time you saw your PCP?: 12/20/21  Why does the patient not have a follow up scheduled?: No established [facility]/outside PCP  Would you like an [facility] PCP?: Yes  Medications  Is patient able to afford medication?: Yes  Is patient unable to get medication due to lack of transportation?: No  Psychological  Does the patient have psycho-social concerns?: Yes  What concerns does the patient have?: Anxiety and/or Depression, Other (see comments) (Comment: Stress/Anxiety)  Food  Does the patient have concerns about food?: No  Communication/Education  Does the patient have limited English proficiency/English  not primary language?: No  Does patient have low literacy and/or low health literacy?: Yes (Comment: Low Health Literacy/No established PCP/No healthcare coverage until recently)  Does patient have concerns with care?: No  Does patient have dissatisfaction with care?: No  Other Financial Concerns  Does the patient have immediate financial distress?: No  Does the patient have general financial concerns?: No  Other Social Barriers/Concerns  Does the patient have any additional barriers or concerns?: Other (see comments) (Comment: Stress/Anxiety-Recently applied for Health insurance/has not received ID cards)  Primary Barrier  Barriers identified: Structural barrier (service availability, waiting times, etc.), Cognitive barrier (health literacy, language and communication, etc.), Psychological barrier (mistrust, anxiety, etc.) (Comment: Stress/Anxiety- Pt has not had health insurance coverage/Recently applied for Health insurance/has not received ID cards/will establish PCP when insurance card is received)  Root Cause of ED Utilization: Lack of Access to Primary Care  Plan to address Lack of Access to Primary Care: Provided information for [facility] 24/7 Nurse Triage line [phone] or 1-866-[facility] ([phone])  Next steps: Provided Education (Comment: Providing Medicaid application information, stress management)  Was education/educational materials provided surrounding PCP services/creating a medical home?: Yes Was education verbal or written?: Written     Was education/educational materials provided surrounding low cost, healthy foods?: Yes Was education verbal or written?: Written     Was education/educational materials provided surrounding other items? If so, use comment to explain.: Yes (Comment: Medicaid application information/stress management) Was education verbal or written?: Written   Plan: Provided information for [facility] 24/7 Nurse triage line, [phone] or 1-[facility]  ([phone])  Expected Date of Follow Up 1: 12/28/22  Additional Documentation: I spoke with patient today regarding 12/14/22 ED visit for abdominal cramping. Pt was unable to schedule a follow up appt with a PCP because she was lacking insurance coverage. Pt stated that she did apply for coverage with BC/BS, was approved and is waiting for her insurance ID cards. Pt also said that she was told she qualified for Medicaid, but has not applied.  Pt stated that she does not currently experiences any food or financial difficulties. Pt said that she is experiencing some stress and anxiety, but that it is manageable at this time. Pt said that she exercises regularly and has a great support system.  Pt does not have any additional resource needs at this time. I told patient that I would follow up with her next week to see if her insurance id cards have arrived and to schedule her an appt with a pcp to establish care.  Pt was provided resource information for Medicaid application process, stress management, along with Right Care Right Place form, [facility] Virtual Visit flyer, [facility] 24/[facility]#, [facility] Nurse Triage #, and Healthy Heart diet educational information.         Social History     Socioeconomic History    Marital status: Single   Tobacco Use    Smoking status: Unknown   Substance and Sexual Activity    Alcohol use: Not Currently    Drug use: Not Currently    Sexual activity: Not Currently     Social Determinants of Health     Financial Resource Strain: Low Risk     Difficulty of Paying Living Expenses: Not hard at all   Food Insecurity: No Food Insecurity    Worried About Running Out of Food in the Last Year: Never true    Ran Out of Food in the Last Year: Never true   Transportation Needs: No Transportation Needs    Lack of Transportation (Medical): No    Lack of Transportation (Non-Medical): No   Physical Activity: Sufficiently Active    Days of Exercise per Week: 3 days    Minutes of Exercise per Session:  60 min   Stress: Stress Concern Present    Feeling of Stress : To some extent   Social Connections: Socially Isolated    Frequency of Communication with Friends and Family: Three times a week    Frequency of Social Gatherings with Friends and Family: Three times a week    Attends [employer] Services: Never    Active Member of Clubs or Organizations: No    Attends Club or Organization Meetings: Never    Marital Status: Never    Housing Stability: Unknown    Unable to Pay for Housing in the Last Year: No    Unstable Housing in the Last Year: No       Plan:   I spoke with patient today regarding 12/14/22 ED visit for abdominal cramping. Pt was unable to schedule a follow up appt with a PCP because she was lacking insurance coverage. Pt stated that she did apply for coverage with BC/BS, was approved and is waiting for her insurance ID cards. Pt also said that she was told she qualified for Medicaid, but has not applied.  Pt stated that she does not currently experiences any food or financial difficulties. Pt said that she is experiencing some stress and anxiety, but that it is manageable at this time. Pt said that she exercises regularly and has a great support system.  Pt does not have any additional resource needs at this time. I told patient that I would follow up with her next week to see if her insurance id cards have arrived and to schedule her an appt with a pcp to establish care.  Pt was provided resource information for Medicaid application process, stress management, along with Right Care Right Place form, [facility] Virtual Visit flyer, [facility] 24[facility]#, [facility] Nurse Triage #, and Healthy Heart diet educational information.

## 2022-12-28 ENCOUNTER — PATIENT OUTREACH (OUTPATIENT)
Dept: EMERGENCY MEDICINE | Facility: HOSPITAL | Age: 22
End: 2022-12-28

## 2022-12-28 NOTE — PROGRESS NOTES
I spoke with patient to follow up on information provided for the Medicaid application process. Pt received the information and is in the process of applying.  Pt received new BC/BS insurance cards and will provide information at next follow up. Pt was at work and could not have a long detailed conversation. Pt works in [place] and gets off of work at 3:30, usually arriving home around 4:15.  I will contact patient for additional follow up on 12/29/22 after 4:15 p.m.

## 2023-01-25 ENCOUNTER — PATIENT OUTREACH (OUTPATIENT)
Dept: EMERGENCY MEDICINE | Facility: HOSPITAL | Age: 23
End: 2023-01-25
Payer: COMMERCIAL

## 2023-01-25 NOTE — PROGRESS NOTES
I contacted pt for second follow up to confirm that pt received a response from Medicaid on her insurance. Pt was provided Medicaid application information and pt applied. Pt also purchased insurance on her own until Medicaid policy is effective. I requested that pt respond by e-mail  or phone to let me know if she received her medicaid insurance cards.  I will follow up with pt in 4 weeks if pt has not responded.    Tracy Saucedo

## 2023-02-22 ENCOUNTER — PATIENT OUTREACH (OUTPATIENT)
Dept: EMERGENCY MEDICINE | Facility: HOSPITAL | Age: 23
End: 2023-02-22
Payer: COMMERCIAL

## 2023-04-07 ENCOUNTER — HOSPITAL ENCOUNTER (EMERGENCY)
Facility: HOSPITAL | Age: 23
Discharge: HOME | End: 2023-04-07
Attending: EMERGENCY MEDICINE
Payer: COMMERCIAL

## 2023-04-07 VITALS
RESPIRATION RATE: 20 BRPM | DIASTOLIC BLOOD PRESSURE: 84 MMHG | OXYGEN SATURATION: 100 % | WEIGHT: 132.5 LBS | HEART RATE: 106 BPM | SYSTOLIC BLOOD PRESSURE: 147 MMHG | BODY MASS INDEX: 21.29 KG/M2 | TEMPERATURE: 99 F | HEIGHT: 66 IN

## 2023-04-07 DIAGNOSIS — M54.50 ACUTE LEFT-SIDED LOW BACK PAIN WITHOUT SCIATICA: ICD-10-CM

## 2023-04-07 DIAGNOSIS — N39.0 URINARY TRACT INFECTION WITHOUT HEMATURIA, SITE UNSPECIFIED: ICD-10-CM

## 2023-04-07 DIAGNOSIS — N12 PYELONEPHRITIS: Primary | ICD-10-CM

## 2023-04-07 LAB
B-HCG UR QL: NEGATIVE
BACTERIA #/AREA URNS HPF: ABNORMAL /HPF
BILIRUB UR QL STRIP: NEGATIVE
CLARITY UR: ABNORMAL
COLOR UR: YELLOW
GLUCOSE UR QL STRIP: NEGATIVE
HGB UR QL STRIP: ABNORMAL
KETONES UR QL STRIP: ABNORMAL
LEUKOCYTE ESTERASE UR QL STRIP: ABNORMAL
MICROSCOPIC COMMENT: ABNORMAL
NITRITE UR QL STRIP: NEGATIVE
PH UR STRIP: 6 [PH] (ref 5–8)
PROT UR QL STRIP: ABNORMAL
RBC #/AREA URNS HPF: 1 /HPF (ref 0–4)
SP GR UR STRIP: 1.01 (ref 1–1.03)
URN SPEC COLLECT METH UR: ABNORMAL
UROBILINOGEN UR STRIP-ACNC: NEGATIVE EU/DL
WBC #/AREA URNS HPF: 96 /HPF (ref 0–5)
WBC CLUMPS URNS QL MICRO: ABNORMAL

## 2023-04-07 PROCEDURE — 81025 URINE PREGNANCY TEST: CPT | Performed by: EMERGENCY MEDICINE

## 2023-04-07 PROCEDURE — 87088 URINE BACTERIA CULTURE: CPT | Performed by: EMERGENCY MEDICINE

## 2023-04-07 PROCEDURE — 25000003 PHARM REV CODE 250: Performed by: EMERGENCY MEDICINE

## 2023-04-07 PROCEDURE — 87186 SC STD MICRODIL/AGAR DIL: CPT | Performed by: EMERGENCY MEDICINE

## 2023-04-07 PROCEDURE — 96372 THER/PROPH/DIAG INJ SC/IM: CPT | Performed by: EMERGENCY MEDICINE

## 2023-04-07 PROCEDURE — 87077 CULTURE AEROBIC IDENTIFY: CPT | Performed by: EMERGENCY MEDICINE

## 2023-04-07 PROCEDURE — 87086 URINE CULTURE/COLONY COUNT: CPT | Performed by: EMERGENCY MEDICINE

## 2023-04-07 PROCEDURE — 63600175 PHARM REV CODE 636 W HCPCS: Performed by: EMERGENCY MEDICINE

## 2023-04-07 PROCEDURE — 81000 URINALYSIS NONAUTO W/SCOPE: CPT | Performed by: EMERGENCY MEDICINE

## 2023-04-07 PROCEDURE — 99285 EMERGENCY DEPT VISIT HI MDM: CPT | Mod: 25

## 2023-04-07 RX ORDER — HYDROCODONE BITARTRATE AND ACETAMINOPHEN 10; 325 MG/1; MG/1
1 TABLET ORAL
Status: COMPLETED | OUTPATIENT
Start: 2023-04-07 | End: 2023-04-07

## 2023-04-07 RX ORDER — NAPROXEN 500 MG/1
500 TABLET ORAL 2 TIMES DAILY WITH MEALS
Qty: 20 TABLET | Refills: 0 | Status: CANCELLED | OUTPATIENT
Start: 2023-04-07

## 2023-04-07 RX ORDER — CYCLOBENZAPRINE HCL 10 MG
10 TABLET ORAL 3 TIMES DAILY PRN
Qty: 9 TABLET | Refills: 0 | Status: SHIPPED | OUTPATIENT
Start: 2023-04-07 | End: 2023-04-12

## 2023-04-07 RX ORDER — HYDROCODONE BITARTRATE AND ACETAMINOPHEN 5; 325 MG/1; MG/1
1 TABLET ORAL EVERY 6 HOURS PRN
Qty: 12 TABLET | Refills: 0 | Status: SHIPPED | OUTPATIENT
Start: 2023-04-07 | End: 2023-04-21

## 2023-04-07 RX ORDER — TRAMADOL HYDROCHLORIDE 50 MG/1
50 TABLET ORAL EVERY 6 HOURS PRN
Qty: 12 TABLET | Refills: 0 | Status: CANCELLED | OUTPATIENT
Start: 2023-04-07

## 2023-04-07 RX ORDER — KETOROLAC TROMETHAMINE 10 MG/1
10 TABLET, FILM COATED ORAL EVERY 6 HOURS
Qty: 15 TABLET | Refills: 0 | Status: SHIPPED | OUTPATIENT
Start: 2023-04-07 | End: 2023-04-21

## 2023-04-07 RX ORDER — CYCLOBENZAPRINE HCL 10 MG
10 TABLET ORAL 3 TIMES DAILY PRN
Qty: 9 TABLET | Refills: 0 | Status: CANCELLED | OUTPATIENT
Start: 2023-04-07 | End: 2023-04-12

## 2023-04-07 RX ORDER — ORPHENADRINE CITRATE 30 MG/ML
60 INJECTION INTRAMUSCULAR; INTRAVENOUS
Status: COMPLETED | OUTPATIENT
Start: 2023-04-07 | End: 2023-04-07

## 2023-04-07 RX ORDER — NITROFURANTOIN 25; 75 MG/1; MG/1
100 CAPSULE ORAL 2 TIMES DAILY
Qty: 10 CAPSULE | Refills: 0 | Status: CANCELLED | OUTPATIENT
Start: 2023-04-07 | End: 2023-04-12

## 2023-04-07 RX ORDER — LEVOFLOXACIN 750 MG/1
750 TABLET ORAL DAILY
Qty: 5 TABLET | Refills: 0 | Status: SHIPPED | OUTPATIENT
Start: 2023-04-07 | End: 2023-04-21

## 2023-04-07 RX ADMIN — ORPHENADRINE CITRATE 60 MG: 30 INJECTION INTRAMUSCULAR; INTRAVENOUS at 06:04

## 2023-04-07 RX ADMIN — HYDROCODONE BITARTRATE AND ACETAMINOPHEN 1 TABLET: 10; 325 TABLET ORAL at 12:04

## 2023-04-07 RX ADMIN — HYDROCODONE BITARTRATE AND ACETAMINOPHEN 1 TABLET: 10; 325 TABLET ORAL at 06:04

## 2023-04-07 NOTE — ED PROVIDER NOTES
SCRIBE #1 NOTE: I, Akil Dupont, am scribing for, and in the presence of, No att. providers found. I have scribed the entire note.       History     Chief Complaint   Patient presents with    Back Pain     Pt reports pain to L spine x1 day. Began after lifting. Denies dysuria.     Review of patient's allergies indicates:  No Known Allergies      History of Present Illness     HPI    4/7/2023, 8:02 AM  History obtained from the patient      History of Present Illness: Radha Desouza is a 22 y.o. female patient who presents to the Emergency Department for evaluation of lower left back pain which onset yesterday at 5am. Pt reports pain began after lifting at work. Symptoms are constant and moderate in severity. No mitigating or exacerbating factors reported. No Associated sxs. Patient denies any fever, chills, congestion, n/v/d, dysuria, SOB, and all other sxs at this time. No Prior Tx. No further complaints or concerns at this time.       Arrival mode: Personal vehicle      PCP: Primary Doctor No        Past Medical History:  History reviewed. No pertinent past medical history.    Past Surgical History:  History reviewed. No pertinent surgical history.      Family History:  Family History   Problem Relation Age of Onset    No Known Problems Mother        Social History:  Social History     Tobacco Use    Smoking status: Never    Smokeless tobacco: Never   Substance and Sexual Activity    Alcohol use: Not Currently    Drug use: Never    Sexual activity: Not Currently        Review of Systems     Review of Systems   Constitutional:  Negative for chills and fever.   HENT:  Negative for congestion and sore throat.    Respiratory:  Negative for shortness of breath.    Cardiovascular:  Negative for chest pain.   Gastrointestinal:  Negative for diarrhea, nausea and vomiting.   Genitourinary:  Negative for dysuria.   Musculoskeletal:  Positive for back pain.   Skin:  Negative for rash.   Neurological:  Negative for weakness.  "  Hematological:  Does not bruise/bleed easily.   All other systems reviewed and are negative.     Physical Exam     Initial Vitals [04/07/23 0558]   BP Pulse Resp Temp SpO2   125/79 (!) 122 18 99.8 °F (37.7 °C) 97 %      MAP       --          Physical Exam  Nursing Notes and Vital Signs Reviewed.  Constitutional: Patient is in no acute distress. Well-developed and well-nourished.  Head: Atraumatic. Normocephalic.  Eyes: PERRL. EOM intact. Conjunctivae are not pale. No scleral icterus.  ENT: Mucous membranes are moist. Oropharynx is clear and symmetric.    Neck: Supple. Full ROM. No lymphadenopathy.  Cardiovascular: Regular rate. Regular rhythm. No murmurs, rubs, or gallops. Distal pulses are 2+ and symmetric.  Pulmonary/Chest: No respiratory distress. Clear to auscultation bilaterally. No wheezing or rales.  Abdominal: Soft and non-distended.  There is no tenderness.  No rebound, guarding, or rigidity. Good bowel sounds.  Genitourinary: No CVA tenderness  Musculoskeletal: Moves all extremities. No obvious deformities. No edema. No calf tenderness. Tenderness to left spinal muscle. Worst than the right side.  No CVAT  Skin: Warm and dry.  Neurological:  Alert, awake, and appropriate.  Normal speech.  No acute focal neurological deficits are appreciated.  Psychiatric: Normal affect. Good eye contact. Appropriate in content.     ED Course   Procedures  ED Vital Signs:  Vitals:    04/07/23 0558 04/07/23 0630 04/07/23 0633 04/07/23 0700   BP: 125/79  138/72 127/72   Pulse: (!) 122  (!) 111 85   Resp: 18 20  16   Temp: 99.8 °F (37.7 °C)   99.8 °F (37.7 °C)   TempSrc: Oral   Oral   SpO2: 97%  100%    Weight: 60.1 kg (132 lb 7.9 oz)      Height: 5' 6" (1.676 m)       04/07/23 0800 04/07/23 0830 04/07/23 1130 04/07/23 1202   BP: 133/75 118/71 (!) 142/106    Pulse: 81 77 88    Resp: 16 16 19 20   Temp:       TempSrc:       SpO2: 99% 100% 100%    Weight:       Height:        04/07/23 1204   BP: (!) 147/84   Pulse: 106   Resp: "    Temp: 99.4 °F (37.4 °C)   TempSrc: Oral   SpO2: 100%   Weight:    Height:        Abnormal Lab Results:  Labs Reviewed   CULTURE, URINE - Abnormal; Notable for the following components:       Result Value    Urine Culture, Routine   (*)     Value: GRAM NEGATIVE JODI  >100,000 cfu/ml  Identification and susceptibility pending      All other components within normal limits    Narrative:     Specimen Source->Urine   URINALYSIS, REFLEX TO URINE CULTURE - Abnormal; Notable for the following components:    Appearance, UA Hazy (*)     Protein, UA Trace (*)     Ketones, UA 2+ (*)     Occult Blood UA Trace (*)     Leukocytes, UA 3+ (*)     All other components within normal limits    Narrative:     Specimen Source->Urine   URINALYSIS MICROSCOPIC - Abnormal; Notable for the following components:    WBC, UA 96 (*)     WBC Clumps, UA Moderate (*)     Bacteria Many (*)     All other components within normal limits    Narrative:     Specimen Source->Urine   PREGNANCY TEST, URINE RAPID    Narrative:     Specimen Source->Urine        All Lab Results:  Results for orders placed or performed during the hospital encounter of 04/07/23   Urine culture    Specimen: Urine   Result Value Ref Range    Urine Culture, Routine (A)      GRAM NEGATIVE JODI  >100,000 cfu/ml  Identification and susceptibility pending     Urinalysis, Reflex to Urine Culture Urine, Clean Catch    Specimen: Urine   Result Value Ref Range    Specimen UA Urine, Clean Catch     Color, UA Yellow Yellow, Straw, Patricia    Appearance, UA Hazy (A) Clear    pH, UA 6.0 5.0 - 8.0    Specific Gravity, UA 1.010 1.005 - 1.030    Protein, UA Trace (A) Negative    Glucose, UA Negative Negative    Ketones, UA 2+ (A) Negative    Bilirubin (UA) Negative Negative    Occult Blood UA Trace (A) Negative    Nitrite, UA Negative Negative    Urobilinogen, UA Negative <2.0 EU/dL    Leukocytes, UA 3+ (A) Negative   Rapid Pregnancy, Urine   Result Value Ref Range    Preg Test, Ur Negative     Urinalysis Microscopic   Result Value Ref Range    RBC, UA 1 0 - 4 /hpf    WBC, UA 96 (H) 0 - 5 /hpf    WBC Clumps, UA Moderate (A) None-Rare    Bacteria Many (A) None-Occ /hpf    Microscopic Comment SEE COMMENT         Imaging Results:  Imaging Results              CT Renal Stone Study ABD Pelvis WO (Final result)  Result time 04/07/23 09:04:31      Final result by Zander Dejesus MD (04/07/23 09:04:31)                   Impression:      Mild left-sided perinephric stranding and Lavonne ureteral stranding as well as mild nonspecific bladder wall thickening could reflect UTI or recent stone passage.    All CT scans at this facility use dose modulation, iterative reconstruction, and/or weight based dosing when appropriate to reduce radiation dose to as low as reasonable achievable.      Electronically signed by: Zander Dejesus MD  Date:    04/07/2023  Time:    09:04               Narrative:    EXAMINATION:  CT RENAL STONE STUDY ABD PELVIS WO    CLINICAL HISTORY:  Flank pain, kidney stone suspected;    TECHNIQUE:  Low dose axial images, sagittal and coronal reformations were obtained from the lung bases to the pubic symphysis.  Oral contrast was not administered.    COMPARISON:  None    FINDINGS:  Heart: Normal size. No effusion.    Lung Bases: Clear.    Liver: Normal size and attenuation. No focal lesions.    Gallbladder: No calcified gallstones.    Bile Ducts: No dilatation.    Pancreas: No obvious mass. No peripancreatic fat stranding.    Spleen: Normal.    Adrenals: Normal.    Kidneys/Ureters: Normal size.  Mild left-sided perinephric stranding and Lavonne ureteral stranding as well as mild nonspecific bladder wall thickening could reflect UTI or recent stone passage.  No radiopaque stone is identified.  Right kidney is unremarkable.    Bladder: No wall thickening.    Reproductive organs: Normal.    GI Tract/Mesentery: No evidence of bowel obstruction or inflammation.  Mild constipation.  No evidence of  appendicitis.    Peritoneal Space: No ascites or free air.    Retroperitoneum: No significant adenopathy.    Abdominal wall: Small fat containing umbilical hernia.    Vasculature: No aneurysm.    Bones: No acute fracture. No suspicious lytic or sclerotic lesions.                                       X-Ray Lumbar Spine Ap And Lateral (Final result)  Result time 04/07/23 08:08:12      Final result by Carter Monaco MD (04/07/23 08:08:12)                   Impression:      No acute abnormality.  Additional findings as above.      Electronically signed by: Carter Monaco  Date:    04/07/2023  Time:    08:08               Narrative:    EXAMINATION:  XR LUMBAR SPINE AP AND LATERAL    CLINICAL HISTORY:  low back pain;    TECHNIQUE:  AP, lateral and spot images were performed of the lumbar spine.    COMPARISON:  None    FINDINGS:  No acute fracture or suspicious osseous lesion evident.  Mild straightening of the normal lumbar lordosis without evidence of traumatic malalignment.  Satisfactory disc heights.  No significant degenerative changes are evident.  Incidentally visualized structures appear intact.                                                The Emergency Provider reviewed the vital signs and test results, which are outlined above.     ED Discussion         11:47 AM: Reassessed pt at this time. Discussed with pt all pertinent ED information and results. Discussed pt dx and plan of tx. Gave pt all f/u and return to the ED instructions. All questions and concerns were addressed at this time. Pt expresses understanding of information and instructions, and is comfortable with plan to discharge. Pt is stable for discharge.    I discussed with patient and/or family/caretaker that evaluation in the ED does not suggest any emergent or life threatening medical conditions requiring immediate intervention beyond what was provided in the ED, and I believe patient is safe for discharge.  Regardless, an unremarkable  evaluation in the ED does not preclude the development or presence of a serious of life threatening condition. As such, patient was instructed to return immediately for any worsening or change in current symptoms.       Medical Decision Making  Problems Addressed:  Acute left-sided low back pain without sciatica: acute illness or injury that poses a threat to life or bodily functions  Pyelonephritis: undiagnosed new problem with uncertain prognosis    Amount and/or Complexity of Data Reviewed  Independent Historian:      Details: Boyfriend relates that pt could not get any relief at home  Labs: ordered. Decision-making details documented in ED Course.     Details: + UTI  Radiology: ordered and independent interpretation performed.     Details: CT Renal Stone: No evidence of kidney stone  X-Ray Lumbar Spine: No acute abnormality.    Risk  Prescription drug management.  Parenteral controlled substances.  Decision regarding hospitalization.  Risk Details: Pt appropriate for outpt management c pain control and abx.  Pt and boyfriend agree c tx plan at this time                    ED Medication(s):  Medications   HYDROcodone-acetaminophen  mg per tablet 1 tablet (1 tablet Oral Given 4/7/23 0630)   orphenadrine injection 60 mg (60 mg Intramuscular Given 4/7/23 0631)   HYDROcodone-acetaminophen  mg per tablet 1 tablet (1 tablet Oral Given 4/7/23 1202)       Discharge Medication List as of 4/7/2023 11:50 AM        START taking these medications    Details   cyclobenzaprine (FLEXERIL) 10 MG tablet Take 1 tablet (10 mg total) by mouth 3 (three) times daily as needed for Muscle spasms., Starting Fri 4/7/2023, Until Wed 4/12/2023 at 2359, Normal      HYDROcodone-acetaminophen (NORCO) 5-325 mg per tablet Take 1 tablet by mouth every 6 (six) hours as needed for Pain., Starting Fri 4/7/2023, Normal      ketorolac (TORADOL) 10 mg tablet Take 1 tablet (10 mg total) by mouth every 6 (six) hours., Starting Fri 4/7/2023,  Normal      levoFLOXacin (LEVAQUIN) 750 MG tablet Take 1 tablet (750 mg total) by mouth once daily., Starting Fri 4/7/2023, Normal              Follow-up Information       [facility] In 3 days.    Why: Call [phone] to arrange follow-up with an [facility] clinician.  Contact information:  [address] [facility] [address]  [phone]                                 Scribe Attestation:   Scribe #1: I performed the above scribed service and the documentation accurately describes the services I performed. I attest to the accuracy of the note.     Attending:   Physician Attestation Statement for Scribe #1: I, Akil Dupont MD, personally performed the services described in this documentation, as scribed by My Emanuel, in my presence, and it is both accurate and complete.           Clinical Impression       ICD-10-CM ICD-9-CM   1. Pyelonephritis  N12 590.80   2. Urinary tract infection without hematuria, site unspecified  N39.0 599.0   3. Acute left-sided low back pain without sciatica  M54.50 724.2       Disposition:   Disposition: Discharged  Condition: Stable      Akil Dupont MD  04/09/23 [id number]

## 2023-04-09 ENCOUNTER — HOSPITAL ENCOUNTER (EMERGENCY)
Facility: HOSPITAL | Age: 23
Discharge: HOME | End: 2023-04-09
Attending: EMERGENCY MEDICINE
Payer: COMMERCIAL

## 2023-04-09 VITALS
OXYGEN SATURATION: 100 % | WEIGHT: 130.06 LBS | DIASTOLIC BLOOD PRESSURE: 71 MMHG | TEMPERATURE: 98 F | HEART RATE: 103 BPM | BODY MASS INDEX: 20.99 KG/M2 | RESPIRATION RATE: 16 BRPM | SYSTOLIC BLOOD PRESSURE: 118 MMHG

## 2023-04-09 DIAGNOSIS — R11.0 NAUSEA: ICD-10-CM

## 2023-04-09 DIAGNOSIS — R53.83 FATIGUE, UNSPECIFIED TYPE: Primary | ICD-10-CM

## 2023-04-09 LAB — BACTERIA UR CULT: ABNORMAL

## 2023-04-09 PROCEDURE — 99284 EMERGENCY DEPT VISIT MOD MDM: CPT | Mod: 25

## 2023-04-09 RX ORDER — ONDANSETRON 8 MG/1
8 TABLET, ORALLY DISINTEGRATING ORAL 3 TIMES DAILY PRN
Qty: 20 TABLET | Refills: 0 | Status: SHIPPED | OUTPATIENT
Start: 2023-04-09 | End: 2023-04-21

## 2023-04-09 NOTE — Clinical Note
"Radha "Mikayla Desouza was seen and treated in our emergency department on 4/9/2023.  She may return to work on 04/12/2023.       If you have any questions or concerns, please don't hesitate to call.      Elijah Crawford NP"

## 2023-04-09 NOTE — ED PROVIDER NOTES
Encounter Date: 4/9/2023       History     Chief Complaint   Patient presents with    General Illness     Recently treated for kidney infection. Pt states she is still not feeling well     22-year-old female with complaint of continued weakness and fatigue since treated for kidney infection 2 days ago.  Patient also reports nausea.  No fever.  No chills.  Patient reports she is taken 2 days' worth of antibiotics.      Review of patient's allergies indicates:  No Known Allergies  History reviewed. No pertinent past medical history.  History reviewed. No pertinent surgical history.  Family History   Problem Relation Age of Onset    No Known Problems Mother      Social History     Tobacco Use    Smoking status: Never    Smokeless tobacco: Never   Substance Use Topics    Alcohol use: Not Currently    Drug use: Never     Review of Systems   Constitutional:  Negative for fever.   HENT:  Negative for sore throat.    Respiratory:  Negative for shortness of breath.    Cardiovascular:  Negative for chest pain.   Gastrointestinal:  Negative for nausea.   Genitourinary:  Negative for dysuria.   Musculoskeletal:  Positive for back pain.   Skin:  Negative for rash.   Neurological:  Negative for weakness.   Hematological:  Does not bruise/bleed easily.     Physical Exam     Initial Vitals [04/09/23 1614]   BP Pulse Resp Temp SpO2   118/71 103 16 98.2 °F (36.8 °C) 100 %      MAP       --         Physical Exam    Nursing note and vitals reviewed.  Constitutional: She appears well-developed and well-nourished.   HENT:   Head: Normocephalic and atraumatic.   Eyes: Conjunctivae and EOM are normal. Pupils are equal, round, and reactive to light.   Neck: Neck supple.   Normal range of motion.  Cardiovascular:  Normal rate, regular rhythm, normal heart sounds and intact distal pulses.           Pulmonary/Chest: Breath sounds normal.   Abdominal: Abdomen is soft. There is no abdominal tenderness. There is no rebound and no guarding.    Musculoskeletal:         General: Normal range of motion.      Cervical back: Normal range of motion and neck supple.      Comments: No CVA tenderness     Neurological: She is alert and oriented to person, place, and time. She has normal strength and normal reflexes.   Skin: Skin is warm and dry.   Psychiatric: She has a normal mood and affect. Her behavior is normal. Thought content normal.       ED Course   Procedures  Labs Reviewed - No data to display       Imaging Results    None          Medications - No data to display                           Clinical Impression:   Final diagnoses:  [R53.83] Fatigue, unspecified type (Primary)  [R11.0] Nausea        ED Disposition Condition    Discharge Stable          ED Prescriptions       Medication Sig Dispense Start Date End Date Auth. Provider    ondansetron (ZOFRAN-ODT) 8 MG TbDL Take 1 tablet (8 mg total) by mouth 3 (three) times daily as needed (nausea). 20 tablet 4/9/2023 -- Elijah Crawford NP          Follow-up Information       Follow up With Specialties Details Why Contact Info    PCP  Schedule an appointment as soon as possible for a visit  As needed              Elijah Crawford NP  04/09/23 5495

## 2023-04-21 ENCOUNTER — LAB VISIT (OUTPATIENT)
Dept: LAB | Facility: HOSPITAL | Age: 23
End: 2023-04-21
Attending: OBSTETRICS & GYNECOLOGY
Payer: COMMERCIAL

## 2023-04-21 ENCOUNTER — OFFICE VISIT (OUTPATIENT)
Dept: OBSTETRICS AND GYNECOLOGY | Facility: CLINIC | Age: 23
End: 2023-04-21
Payer: COMMERCIAL

## 2023-04-21 VITALS
HEIGHT: 66 IN | BODY MASS INDEX: 21.25 KG/M2 | DIASTOLIC BLOOD PRESSURE: 68 MMHG | SYSTOLIC BLOOD PRESSURE: 122 MMHG | WEIGHT: 132.25 LBS

## 2023-04-21 DIAGNOSIS — Z01.419 ENCOUNTER FOR GYNECOLOGICAL EXAMINATION (GENERAL) (ROUTINE) WITHOUT ABNORMAL FINDINGS: Primary | ICD-10-CM

## 2023-04-21 DIAGNOSIS — Z12.4 SCREENING FOR CERVICAL CANCER: ICD-10-CM

## 2023-04-21 DIAGNOSIS — Z11.3 SCREENING EXAMINATION FOR STD (SEXUALLY TRANSMITTED DISEASE): ICD-10-CM

## 2023-04-21 DIAGNOSIS — Z30.41 ENCOUNTER FOR SURVEILLANCE OF CONTRACEPTIVE PILLS: ICD-10-CM

## 2023-04-21 PROCEDURE — 3078F DIAST BP <80 MM HG: CPT | Mod: CPTII,S$GLB,, | Performed by: OBSTETRICS & GYNECOLOGY

## 2023-04-21 PROCEDURE — 99999 PR PBB SHADOW E&M-EST. PATIENT-LVL III: ICD-10-PCS | Mod: PBBFAC,,, | Performed by: OBSTETRICS & GYNECOLOGY

## 2023-04-21 PROCEDURE — 3074F PR MOST RECENT SYSTOLIC BLOOD PRESSURE < 130 MM HG: ICD-10-PCS | Mod: CPTII,S$GLB,, | Performed by: OBSTETRICS & GYNECOLOGY

## 2023-04-21 PROCEDURE — 3074F SYST BP LT 130 MM HG: CPT | Mod: CPTII,S$GLB,, | Performed by: OBSTETRICS & GYNECOLOGY

## 2023-04-21 PROCEDURE — 88175 CYTOPATH C/V AUTO FLUID REDO: CPT | Performed by: OBSTETRICS & GYNECOLOGY

## 2023-04-21 PROCEDURE — 36415 COLL VENOUS BLD VENIPUNCTURE: CPT | Performed by: OBSTETRICS & GYNECOLOGY

## 2023-04-21 PROCEDURE — 3008F PR BODY MASS INDEX (BMI) DOCUMENTED: ICD-10-PCS | Mod: CPTII,S$GLB,, | Performed by: OBSTETRICS & GYNECOLOGY

## 2023-04-21 PROCEDURE — 3078F PR MOST RECENT DIASTOLIC BLOOD PRESSURE < 80 MM HG: ICD-10-PCS | Mod: CPTII,S$GLB,, | Performed by: OBSTETRICS & GYNECOLOGY

## 2023-04-21 PROCEDURE — 86592 SYPHILIS TEST NON-TREP QUAL: CPT | Performed by: OBSTETRICS & GYNECOLOGY

## 2023-04-21 PROCEDURE — 99385 PREV VISIT NEW AGE 18-39: CPT | Mod: S$GLB,,, | Performed by: OBSTETRICS & GYNECOLOGY

## 2023-04-21 PROCEDURE — 1159F PR MEDICATION LIST DOCUMENTED IN MEDICAL RECORD: ICD-10-PCS | Mod: CPTII,S$GLB,, | Performed by: OBSTETRICS & GYNECOLOGY

## 2023-04-21 PROCEDURE — 1159F MED LIST DOCD IN RCRD: CPT | Mod: CPTII,S$GLB,, | Performed by: OBSTETRICS & GYNECOLOGY

## 2023-04-21 PROCEDURE — 99999 PR PBB SHADOW E&M-EST. PATIENT-LVL III: CPT | Mod: PBBFAC,,, | Performed by: OBSTETRICS & GYNECOLOGY

## 2023-04-21 PROCEDURE — 3008F BODY MASS INDEX DOCD: CPT | Mod: CPTII,S$GLB,, | Performed by: OBSTETRICS & GYNECOLOGY

## 2023-04-21 PROCEDURE — 99385 PR PREVENTIVE VISIT,NEW,18-39: ICD-10-PCS | Mod: S$GLB,,, | Performed by: OBSTETRICS & GYNECOLOGY

## 2023-04-21 PROCEDURE — 87389 HIV-1 AG W/HIV-1&-2 AB AG IA: CPT | Performed by: OBSTETRICS & GYNECOLOGY

## 2023-04-21 PROCEDURE — 87591 N.GONORRHOEAE DNA AMP PROB: CPT | Performed by: OBSTETRICS & GYNECOLOGY

## 2023-04-21 RX ORDER — NORGESTIMATE AND ETHINYL ESTRADIOL 0.25-0.035
1 KIT ORAL
COMMUNITY
Start: 2023-04-18 | End: 2023-04-21 | Stop reason: SDUPTHER

## 2023-04-21 RX ORDER — NORGESTIMATE AND ETHINYL ESTRADIOL 0.25-0.035
1 KIT ORAL DAILY
Qty: 90 TABLET | Refills: 3 | Status: SHIPPED | OUTPATIENT
Start: 2023-04-21 | End: 2024-04-20

## 2023-04-21 NOTE — PROGRESS NOTES
Subjective:       Patient ID: Radha Desouza is a 22 y.o. female.    Chief Complaint:  Well Woman      History of Present Illness  HPI  Annual Exam-Premenopausal  Patient presents for annual exam. The patient has no complaints today. The patient is sexually active.--occas condoms /ocp for contraception; 1 partner;    GYN screening history: no prior history of gyn screening tests. The patient wears seatbelts: yes. The patient participates in regular exercise: no. Has the patient ever been transfused or tattooed?: yes.--+tattooes;  The patient reports that there is not domestic violence in her life.  Menses usually monthly, flow 6 days; pads--reg; change q 3 hrs; no double up ; dysmenorrhea relief with tylenol   GYN & OB History  No LMP recorded.   Date of Last Pap: 2023    OB History    Para Term  AB Living   0 0 0 0 0 0   SAB IAB Ectopic Multiple Live Births   0 0 0 0 0       Review of Systems  Review of Systems   All other systems reviewed and are negative.        Objective:      Physical Exam:   Constitutional: She is oriented to person, place, and time. She appears well-developed and well-nourished.     Eyes: Pupils are equal, round, and reactive to light. Conjunctivae and EOM are normal.      Pulmonary/Chest: Effort normal. Right breast exhibits no mass, no nipple discharge, no skin change and no tenderness. Left breast exhibits no mass, no nipple discharge, no skin change and no tenderness. Breasts are symmetrical.        Abdominal: Soft.     Genitourinary:    Vagina, uterus, right adnexa, left adnexa and rectum normal.      Pelvic exam was performed with patient supine.   The external female genitalia was normal.   Labial bartholins normal.Cervix is normal. Right adnexum displays no mass and no tenderness. Left adnexum displays no mass and no tenderness. No erythema,  no vaginal discharge (menstrual), bleeding, rectocele, cystocele or unspecified prolapse of vaginal walls in the vagina.  Vagina was moist.Uerus contour normal  Normal urethral meatus.Urethra findings: no urethral massBladder findings: no bladder distention and no bladder tenderness          Musculoskeletal: Normal range of motion and moves all extremeties.       Neurological: She is alert and oriented to person, place, and time.    Skin: Skin is warm.    Psychiatric: She has a normal mood and affect. Her behavior is normal.         Assessment:        1. Encounter for gynecological examination (general) (routine) without abnormal findings    2. Encounter for surveillance of contraceptive pills    3. Screening for cervical cancer    4. Screening examination for STD (sexually transmitted disease)               Plan:      Continue annual well woman exam.  Pap today; Reviewed updated recommendations for pap smears (every 3 years) in low risk patients.   Recommend annual pelvic exams.  Reviewed recommendations for annual CBE.  Gc/ct today  Continue ocp as directed,  Safe sex  Hiv/rpr testing  Cotninue menstrual calendar  Encouraged exercise

## 2023-04-22 LAB
HIV 1+2 AB+HIV1 P24 AG SERPL QL IA: NORMAL
RPR SER QL: NORMAL

## 2023-04-23 LAB
C TRACH DNA SPEC QL NAA+PROBE: NOT DETECTED
N GONORRHOEA DNA SPEC QL NAA+PROBE: NOT DETECTED

## 2023-04-27 LAB
FINAL PATHOLOGIC DIAGNOSIS: NORMAL
Lab: NORMAL

## 2023-04-27 NOTE — PROGRESS NOTES
Good News! Your pap smear came back and it was normal.   I still recommend doing a pelvic exam and annual visit every year, but you only need the pap test every 3 years. Please call me if you have any further questions.   Sincerely,   Dr. Casanova    yes

## 2024-08-05 ENCOUNTER — HOSPITAL ENCOUNTER (EMERGENCY)
Facility: HOSPITAL | Age: 24
Discharge: HOME | End: 2024-08-05
Attending: EMERGENCY MEDICINE
Payer: COMMERCIAL

## 2024-08-05 VITALS
HEIGHT: 61 IN | DIASTOLIC BLOOD PRESSURE: 77 MMHG | OXYGEN SATURATION: 97 % | BODY MASS INDEX: 27.34 KG/M2 | HEART RATE: 97 BPM | SYSTOLIC BLOOD PRESSURE: 114 MMHG | TEMPERATURE: 99 F | WEIGHT: 144.81 LBS | RESPIRATION RATE: 16 BRPM

## 2024-08-05 DIAGNOSIS — R51.9 FACIAL PAIN: ICD-10-CM

## 2024-08-05 DIAGNOSIS — S09.90XA INJURY OF HEAD, INITIAL ENCOUNTER: ICD-10-CM
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
## 2024-08-05 LAB — B-HCG UR QL: NEGATIVE

## 2024-08-05 PROCEDURE — 99284 EMERGENCY DEPT VISIT MOD MDM: CPT | Mod: 25

## 2024-08-05 PROCEDURE — 81025 URINE PREGNANCY TEST: CPT | Performed by: NURSE PRACTITIONER

## 2024-08-05 RX ORDER — HYDROCODONE BITARTRATE AND ACETAMINOPHEN 5; 325 MG/1; MG/1
1 TABLET ORAL EVERY 6 HOURS PRN
Qty: 12 TABLET | Refills: 0 | Status: SHIPPED | OUTPATIENT
Start: 2024-08-05

## 2024-09-27 NOTE — ED NOTES
Dc per provider.   HPI:   Tressa Osman is a 27 year old female who presents for a complete physical exam.  Last pap:  3/3/23  Last mammogram:  n/a   Contraception:  OCPs  Previous colonoscopy:  n/a  Previous DEXA:  n/a.  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 Children: no.     Now living [facility], working from home.     Wt Readings from Last 6 Encounters:   24 121 lb 3.2 oz (55 kg)   05/10/24 118 lb (53.5 kg)   24 121 lb (54.9 kg)   23 116 lb 9.6 oz (52.9 kg)   23 115 lb (52.2 kg)   22 111 lb (50.3 kg)     Body mass index is 21.13 kg/m².     Cholesterol, Total (mg/dL)   Date Value   2024 183   2019 186     HDL Cholesterol (mg/dL)   Date Value   2024 72 (H)   2019 62 (H)     LDL Cholesterol (mg/dL)   Date Value   2024 89   2019 102 (H)        Current Outpatient Medications   Medication Sig Dispense Refill    Norgestim-Eth Estrad Triphasic 0.18/0.215/0.25 MG-35 MCG Oral Tab Take 1 tablet by mouth daily. 84 tablet 3    Dapsone 7.5 % External Gel Apply 1 Application topically daily.      spironolactone 50 MG Oral Tab TAKE 1 TABLET BY MOUTH TWICE A DAY 60 tablet 1    Tretinoin 0.1 % External Cream Apply a very thin layer to the face at night, followed by oil-free moisturizer 45 g 2    KETOCONAZOLE 2 % External Shampoo WASH SCALP EVERY OTHER DAY, LATHER, LEAVE ON FOR 5 MINUTES THEN RINSE. 120 mL 3      Patient Active Problem List   Diagnosis    Contact dermatitis    Acne, unspecified acne type    Nipple discharge     Past Medical History:    Acne      No past surgical history on file.   Family History   Problem Relation Age of Onset    Hypertension Father     Other (Other) Father          suddenly from acute pancreatitis in     Mental Disorder Maternal Grandmother         depression    Heart Disease Maternal Grandfather       Social  History:   Social History     Socioeconomic History    Marital status: Single   Tobacco Use    Smoking status: Never    Smokeless tobacco: Never   Vaping Use    Vaping status: Never Used   Substance and Sexual Activity    Alcohol use: Yes     Comment: 2 x a month    Drug use: No    Sexual activity: Never   Other Topics Concern    Caffeine Concern Yes     Comment: 1 cup of coffee    Exercise Yes     Comment: walk yoga  x 1    Seat Belt Yes    Self-Exams No        REVIEW OF SYSTEMS:   GENERAL: feels well otherwise  SKIN: denies any unusual skin lesions  EYES:no vision problems  LUNGS: denies shortness of breath  CARDIOVASCULAR: denies chest pain  GI: denies abdominal pain,  No constipation or diarrhea  : denies dysuria, vaginal discharge or itching  NEURO: denies headaches  PSYCHE: denies depression or anxiety    EXAM:   /70   Pulse 82   Temp 97.5 °F (36.4 °C)   Resp 16   Ht 5' 3.5\" (1.613 m)   Wt 121 lb 3.2 oz (55 kg)   LMP 09/10/2024 (Exact Date)   BMI 21.13 kg/m²   Body mass index is 21.13 kg/m².   GENERAL: well developed, well nourished,in no apparent distress  SKIN: no rashes,no suspicious lesions  HEENT: atraumatic, normocephalic,throat are clear; dentition good  EYES:PERRLA, EOMI,conjunctiva are clear  NECK: supple,no adenopathy  BREAST: no dominant or suspicious mass, no nipple discharge  LUNGS: clear to auscultation  CARDIO: RRR without murmur  GI: good BS's,no masses, HSM or tenderness  : /  EXTREMITIES: no edema    ASSESSMENT AND PLAN:   Tressa Osman is a 27 year old female who presents for a complete physical exam.  Encounter Diagnoses   Name Primary?    Routine general medical examination at a health care facility     Irregular menses     Need for influenza vaccination     Eczema, unspecified type      Pap done today  Mammogram:  Age 40  Labs: reviewed  Colonoscopy:  Age 45  Vaccines recommended today:  flu given  Recommended low fat diet and regular exercise.    The patient is asked  to return for CPX in 1 year.  Orders Placed This Encounter   Procedures    Fluzone trivalent vaccine, PF 0.5mL, 6mo+ ([id number])       Meds & Refills for this Visit:  Requested Prescriptions     Signed Prescriptions Disp Refills    Norgestim-Eth Estrad Triphasic 0.18/0.215/0.25 MG-35 MCG Oral Tab 84 tablet 3     Sig: Take 1 tablet by mouth daily.       Imaging & Consults:  INFLUENZA VACCINE, TRI, PRESERV FREE, 0.5 ML  ALLERGY - INTERNAL

## 2024-12-18 ENCOUNTER — OFFICE VISIT (OUTPATIENT)
Dept: URGENT CARE | Facility: CLINIC | Age: 24
End: 2024-12-18
Payer: COMMERCIAL

## 2024-12-18 VITALS
HEART RATE: 98 BPM | OXYGEN SATURATION: 98 % | SYSTOLIC BLOOD PRESSURE: 131 MMHG | DIASTOLIC BLOOD PRESSURE: 85 MMHG | RESPIRATION RATE: 18 BRPM | WEIGHT: 145.5 LBS | HEIGHT: 63 IN | TEMPERATURE: 100 F | BODY MASS INDEX: 25.78 KG/M2

## 2024-12-18 DIAGNOSIS — R05.9 COUGH, UNSPECIFIED TYPE: Primary | ICD-10-CM

## 2024-12-18 DIAGNOSIS — R68.83 CHILLS: ICD-10-CM

## 2024-12-18 DIAGNOSIS — J06.9 UPPER RESPIRATORY TRACT INFECTION, UNSPECIFIED TYPE: ICD-10-CM

## 2024-12-18 PROCEDURE — 99214 OFFICE O/P EST MOD 30 MIN: CPT | Mod: S$GLB,,, | Performed by: NURSE PRACTITIONER

## 2024-12-18 RX ORDER — DEXTROMETHORPHAN HYDROBROMIDE, GUAIFENESIN AND PHENYLEPHRINE HYDROCHLORIDE 15; 400; 10 MG/1; MG/1; MG/1
1 TABLET ORAL EVERY 6 HOURS
Qty: 30 TABLET | Refills: 0 | Status: SHIPPED | OUTPATIENT
Start: 2024-12-18 | End: 2024-12-28

## 2024-12-18 RX ORDER — BENZONATATE 200 MG/1
200 CAPSULE ORAL 3 TIMES DAILY PRN
Qty: 25 CAPSULE | Refills: 0 | Status: SHIPPED | OUTPATIENT
Start: 2024-12-18 | End: 2024-12-28

## 2024-12-18 NOTE — LETTER
December 18, 2024      [facility]  [address], [address]  [facility] [address]  Phone: [phone]  Fax: [phone]       Patient: Radha Desouza   YOB: 2000  Date of Visit: 12/18/2024    To Whom It May Concern:    Nadege Desouza  was at [facility] on 12/18/2024. The patient may return to work/school on 12/20/24 with no restrictions. If you have any questions or concerns, or if I can be of further assistance, please do not hesitate to contact me.    Sincerely,      Lesly Aleman, NP

## 2024-12-19 NOTE — PROGRESS NOTES
"Subjective:      Patient ID: Radha Desouza is a 24 y.o. female.    Vitals:  height is 5' 2.6" (1.59 m) and weight is 66 kg (145 lb 8.1 oz). Her tympanic temperature is 100.3 °F (37.9 °C). Her blood pressure is 131/85 and her pulse is 98. Her respiration is 18 and oxygen saturation is 98%.     Chief Complaint: Cough    Patient present with cough, chills. On set of symptoms Monday, patient was exposure at work to co-workers with flu like symptoms. Patient was seen at [facility] - tested for Flu and Covid-19 - Negative results. Patient needs work excuse for today.     Cough  This is a new problem. The current episode started yesterday. The problem has been unchanged. The problem occurs constantly. The cough is Productive of sputum. Pertinent negatives include no chest pain, chills, ear congestion, ear pain, fever, headaches, hemoptysis, myalgias, nasal congestion, postnasal drip, rash, rhinorrhea, sore throat, shortness of breath, sweats, weight loss or wheezing. Nothing aggravates the symptoms. Risk factors for lung disease include occupational exposure. She has tried nothing for the symptoms. The treatment provided mild relief. There is no history of asthma, bronchiectasis, bronchitis, COPD, emphysema, environmental allergies or pneumonia.       Constitution: Negative for chills and fever.   HENT:  Positive for congestion. Negative for ear pain, postnasal drip and sore throat.    Cardiovascular:  Negative for chest pain.   Respiratory:  Positive for cough. Negative for bloody sputum, shortness of breath, stridor and wheezing.    Gastrointestinal:  Positive for vomiting and diarrhea.   Musculoskeletal:  Negative for muscle ache.   Skin:  Negative for rash.   Allergic/Immunologic: Negative for environmental allergies.   Neurological:  Negative for headaches.      Objective:     Physical Exam   Constitutional: She is oriented to person, place, and time. She appears well-developed. She is cooperative.  Non-toxic appearance. " She does not appear ill. No distress.   HENT:   Head: Normocephalic and atraumatic.   Ears:   Right Ear: Hearing, tympanic membrane, external ear and ear canal normal.   Left Ear: Hearing, tympanic membrane, external ear and ear canal normal.   Nose: Congestion present. No mucosal edema, rhinorrhea or nasal deformity. No epistaxis. Right sinus exhibits no maxillary sinus tenderness and no frontal sinus tenderness. Left sinus exhibits no maxillary sinus tenderness and no frontal sinus tenderness.   Mouth/Throat: Uvula is midline and mucous membranes are normal. No trismus in the jaw. Normal dentition. No uvula swelling. Posterior oropharyngeal erythema present. No oropharyngeal exudate or posterior oropharyngeal edema.   Eyes: Conjunctivae and lids are normal. No scleral icterus.   Neck: Trachea normal and phonation normal. Neck supple. No edema present. No erythema present. No neck rigidity present.   Cardiovascular: Normal rate, regular rhythm, normal heart sounds and normal pulses.   Pulmonary/Chest: Effort normal and breath sounds normal. No respiratory distress. She has no decreased breath sounds. She has no wheezes. She has no rhonchi. She has no rales.   Abdominal: Normal appearance.   Musculoskeletal: Normal range of motion.         General: No deformity. Normal range of motion.   Neurological: She is alert and oriented to person, place, and time. She exhibits normal muscle tone. Coordination normal.   Skin: Skin is warm, dry, intact, not diaphoretic and not pale.   Psychiatric: Her speech is normal and behavior is normal. Judgment and thought content normal.   Nursing note and vitals reviewed.      Assessment:     1. Cough, unspecified type    2. Chills    3. Upper respiratory tract infection, unspecified type        Plan:       Cough, unspecified type    Chills    Upper respiratory tract infection, unspecified type    Other orders  -     benzonatate (TESSALON) 200 MG capsule; Take 1 capsule (200 mg total)  by mouth 3 (three) times daily as needed for Cough.  Dispense: 25 capsule; Refill: 0  -     phenylephrine-DM-guaiFENesin (AQUANAZ) 10- mg Tab; Take 1 tablet by mouth every 6 (six) hours. for 10 days  Dispense: 30 tablet; Refill: 0          Medical Decision Making:   Initial Assessment:   After complete evaluation, including thorough history and physical exam, the patient's symptoms are most likely due to viral upper respiratory infection. There are no concerning features on physical exam to suggest bacterial otitis media/externa, sinusitis, strep pharyngitis, or peritonsillar abscess. Vital signs do not suggest sepsis. Lung sounds are clear and not consistent with pneumonia. There is no neck pain or limited ROM to suggest retropharyngeal abscess or meningitis.The patient will be treated with supportive care. Will provide RX for tessalon upon D/C. Follow up instructions and ED precautions provided.            Patient Instructions   A cold is caused by a virus that can settle in your nose, throat or lungs. This causesa runny or stuffy nose and sneezing. You may also have a sore throat, cough, headache, fever and muscle aches. Different cold viruses last different lengthsof time, but the average time is 2 to 14 days.    Seek immediate medical care if you develop fever, chest pain, or shortness of breath.     Treatment: There is no cure for the common cold, there is only symptomatic care.     Antibiotics may be used to treat signs of a secondary infection, but they do not treat  the cold virus. Try these tips to keep yourself comfortable:                   -Get plenty of rest.                   -Drink plenty of fluids, at least 8 large glasses of fluid a day. Good fluid choices are water, fruit juices high in Vitamin C, tea, gelatin, or broths and soups. These help to keep mucus thin and ease congestion.                  -Use salt water gargle, cough drops or throat sprays to relieve throat pain. Mi ¼ to ½  teaspoon of salt in 1 cup of warm water for a salt water gargle  solution.                  -Use petroleum jelly or lip balm around lips and nose to prevent chapping.                  -Use saline nose drops or spray to help ease congestion.    Over the Counter (OTC) Medicines:  Take over the counter medicines as needed to ease your signs.  Read labels carefully.  Use a product that treats only the signs that you have. Ask your pharmacist  for recommendations. Be sure to ask about possible interactions with other  medicines you are taking.  Common medicines used to treat signs of a cold include:     -Flonase daily.  -Claritin or Zyrtec daily.  -Mucinex every 12 hours -- Drink plenty of water while taking this medication.    - Cough suppressant, also called antitussive, such as dextromethorphan.  This medicine decreases your reflex and sensitivity to cough. This  medicine may be kept behind the pharmacy counter for purchase.    Cold and cough medicines often contain more than one type of medicine.  Ask the pharmacist for help to confirm that you are not using more than one  product with the same or similar ingredient. For example, some cold and  cough medicines have acetaminophen or ibuprofen in them to help lower a  fever or ease muscle aches. Do not take extra acetaminophen (Tylenol) or  ibuprofen (Advil, Motrin) if the cold or cough medicine has it as an  ingredient. Too much medicine could be harmful.    Take the correct dose as listed on the package. Do not take more than  recommended.    Use a Humidifier:  A cool mist humidifier can make breathing easier by thinning mucus. Do not use  a steam humidifier as hot water can cause burns if spilled.  Place the humidifier a few feet from the bed. Drain and clean each day with  soap and water to prevent bacteria and mold from growing.  Indoor humidity should not be above 50%. Stop using the humidifier if you  notice moisture on windows, walls or pictures.  You do not  need to add any medicine to the humidifier.  If you cannot get a humidifier, place a pan of water next to heating vents and  refill the water level daily. The water will evaporate and add moisture to the  Room.    How to prevent the spread of colds  -Wash your hands with soap and water or use alcohol based hand   often. Dry hands wet from washing with soap on a paper towel instead of cloth towel.  -Cough or sneeze into your elbow to avoid spreading germs.  -Wipe down common surfaces, such as door knobs and faucet handles, with a disinfectant spray.  -Do not share cups or utensils.        Please follow up with your Primary care provider within 2-5 days if your signs and symptoms have not resolved or worsen.      If your condition worsens or fails to improve we recommend that you receive another evaluation at the emergency room immediately or contact your primary medical clinic to discuss your concerns.   You must understand that you have received an Urgent Care treatment only and that you may be released before all of your medical problems are known or treated. You, the patient, will arrange for follow up care as instructed.

## 2024-12-20 ENCOUNTER — OFFICE VISIT (OUTPATIENT)
Dept: URGENT CARE | Facility: CLINIC | Age: 24
End: 2024-12-20
Payer: COMMERCIAL

## 2024-12-20 ENCOUNTER — HOSPITAL ENCOUNTER (OUTPATIENT)
Dept: RADIOLOGY | Facility: CLINIC | Age: 24
Discharge: HOME | End: 2024-12-20
Attending: NURSE PRACTITIONER
Payer: COMMERCIAL

## 2024-12-20 VITALS
HEART RATE: 90 BPM | WEIGHT: 145.5 LBS | SYSTOLIC BLOOD PRESSURE: 132 MMHG | RESPIRATION RATE: 22 BRPM | HEIGHT: 63 IN | OXYGEN SATURATION: 97 % | BODY MASS INDEX: 25.78 KG/M2 | TEMPERATURE: 97 F | DIASTOLIC BLOOD PRESSURE: 84 MMHG

## 2024-12-20 DIAGNOSIS — J10.1 INFLUENZA A: Primary | ICD-10-CM

## 2024-12-20 DIAGNOSIS — R06.02 SHORTNESS OF BREATH: ICD-10-CM

## 2024-12-20 DIAGNOSIS — R50.9 FEVER, UNSPECIFIED FEVER CAUSE: ICD-10-CM

## 2024-12-20 LAB
CTP QC/QA: YES
POC MOLECULAR INFLUENZA A AGN: POSITIVE
POC MOLECULAR INFLUENZA B AGN: NEGATIVE

## 2024-12-20 PROCEDURE — 87502 INFLUENZA DNA AMP PROBE: CPT | Mod: QW,S$GLB,, | Performed by: NURSE PRACTITIONER

## 2024-12-20 PROCEDURE — 99214 OFFICE O/P EST MOD 30 MIN: CPT | Mod: S$GLB,,, | Performed by: NURSE PRACTITIONER

## 2024-12-20 PROCEDURE — 71046 X-RAY EXAM CHEST 2 VIEWS: CPT | Mod: S$GLB,,, | Performed by: RADIOLOGY

## 2024-12-20 RX ORDER — PROMETHAZINE HYDROCHLORIDE AND DEXTROMETHORPHAN HYDROBROMIDE 6.25; 15 MG/5ML; MG/5ML
5 SYRUP ORAL EVERY 6 HOURS PRN
Qty: 130 ML | Refills: 0 | Status: SHIPPED | OUTPATIENT
Start: 2024-12-20

## 2024-12-20 RX ORDER — ALBUTEROL SULFATE 90 UG/1
1-2 INHALANT RESPIRATORY (INHALATION) EVERY 4 HOURS PRN
Qty: 6.7 G | Refills: 0 | Status: SHIPPED | OUTPATIENT
Start: 2024-12-20 | End: 2025-01-19

## 2024-12-20 RX ORDER — OSELTAMIVIR PHOSPHATE 75 MG/1
75 CAPSULE ORAL 2 TIMES DAILY
Qty: 10 CAPSULE | Refills: 0 | Status: SHIPPED | OUTPATIENT
Start: 2024-12-20 | End: 2024-12-25

## 2024-12-20 NOTE — PROGRESS NOTES
"Subjective:      Patient ID: Radha Desouza is a 24 y.o. female.    Vitals:  height is 5' 2.6" (1.59 m) and weight is 66 kg (145 lb 8.1 oz). Her tympanic temperature is 97.4 °F (36.3 °C). Her blood pressure is 132/84 and her pulse is 90. Her respiration is 22 (abnormal) and oxygen saturation is 97%.     Chief Complaint: Sinus Problem    Radha Desouza is a 24 year old female whom presents to urgent care for evaluation of SOB, loss of appetite, cough, congestion, sneezing and sinus pressure. On 12/18/24 The patient was evalueed in the ED at [facility]. Covid and flu were negative at this time. She also presented to our urgent care on 12/18/24. She was prescribed Tessalon and aquanaz at this time. She states that she was supposed to go back to work today but states her symptoms are unchanged despite taking the medications as prescribed.      Sinus Problem  This is a new problem. The current episode started in the past 7 days (2). The problem is unchanged. There has been no fever. Associated symptoms include congestion, coughing, sinus pressure and sneezing. Pertinent negatives include no chills, diaphoresis, headaches or sore throat.       Constitution: Negative for chills and sweating.   HENT:  Positive for congestion and sinus pressure. Negative for sore throat.    Respiratory:  Positive for cough.    Allergic/Immunologic: Positive for sneezing.   Neurological:  Negative for headaches.      Objective:     Physical Exam   Constitutional: She is oriented to person, place, and time. She appears well-developed. She is cooperative.  Non-toxic appearance. She appears ill. No distress.   HENT:   Head: Normocephalic and atraumatic.   Ears:   Right Ear: Hearing, tympanic membrane, external ear and ear canal normal.   Left Ear: Hearing, tympanic membrane, external ear and ear canal normal.   Nose: Congestion present. No mucosal edema or nasal deformity. No epistaxis. Right sinus exhibits no maxillary sinus " tenderness and no frontal sinus tenderness. Left sinus exhibits no maxillary sinus tenderness and no frontal sinus tenderness.   Mouth/Throat: Uvula is midline, oropharynx is clear and moist and mucous membranes are normal. Mucous membranes are moist. No trismus in the jaw. Normal dentition. No uvula swelling. Oropharynx is clear.   Eyes: Conjunctivae and lids are normal.   Neck: Trachea normal and phonation normal. Neck supple.   Cardiovascular: Normal rate, regular rhythm, normal heart sounds and normal pulses.   Pulmonary/Chest: Effort normal. She has no wheezes.         Comments: Patient appears physically short of breathe.     Abdominal: Normal appearance and bowel sounds are normal. Soft.   Musculoskeletal: Normal range of motion.         General: Normal range of motion.   Neurological: She is alert and oriented to person, place, and time. She exhibits normal muscle tone.   Skin: Skin is warm, dry, intact and not diaphoretic.   Psychiatric: Her speech is normal and behavior is normal. Judgment and thought content normal.   Nursing note and vitals reviewed.    Results for orders placed or performed in visit on 12/20/24   POCT Influenza A/B MOLECULAR    Collection Time: 12/20/24  3:19 PM   Result Value Ref Range    POC Molecular Influenza A Ag Positive (A) Negative    POC Molecular Influenza B Ag Negative Negative     Acceptable Yes    XR CHEST PA AND LATERAL    Result Date: 12/20/2024  EXAM:  XR CHEST PA AND LATERAL CLINICAL INDICATION:  Shortness of breath FINDINGS: PA and lateral views of the chest were obtained. No comparison studies are available.     The lungs are clear. The cardiac silhouette size is normal. The trachea is midline and the mediastinal width is normal. Negative for focal infiltrate, effusion or pneumothorax. Pulmonary vasculature is normal. Negative for osseous abnormalities.  Tortuous aorta.  Convex right curvature the thoracic lumbar junction.     1.  Negative for acute  process involving the chest. 2.  Incidental findings as noted above. Finalized on: 12/20/2024 3:23 PM By:  Rex Rodrigues MD [facility]# [id number]      2024-12-20 15:25:[id number]    [facility]       Assessment:     1. Influenza A    2. Fever, unspecified fever cause    3. Shortness of breath        Plan:       Influenza A  -     albuterol (PROVENTIL/VENTOLIN HFA) 90 mcg/actuation inhaler; Inhale 1-2 puffs into the lungs every 4 (four) hours as needed for Shortness of Breath.  Dispense: 6.7 g; Refill: 0  -     oseltamivir (TAMIFLU) 75 MG capsule; Take 1 capsule (75 mg total) by mouth 2 (two) times daily. for 5 days  Dispense: 10 capsule; Refill: 0  -     promethazine-dextromethorphan (PROMETHAZINE-DM) 6.25-15 mg/5 mL Syrp; Take 5 mLs by mouth every 6 (six) hours as needed (cough).  Dispense: 130 mL; Refill: 0    Fever, unspecified fever cause  -     XR CHEST PA AND LATERAL; Future; Expected date: 12/20/2024  -     POCT Influenza A/B MOLECULAR    Shortness of breath  -     XR CHEST PA AND LATERAL; Future; Expected date: 12/20/2024  -     POCT Influenza A/B MOLECULAR  -     albuterol (PROVENTIL/VENTOLIN HFA) 90 mcg/actuation inhaler; Inhale 1-2 puffs into the lungs every 4 (four) hours as needed for Shortness of Breath.  Dispense: 6.7 g; Refill: 0          Medical Decision Making:   Urgent Care Management:  Previous encounters and labs were independently reviewed. Discussed with patient and Mom (via patients phone) all pertinent information and results. Discussed patient diagnosis and plan of treatment. Additional plan of care as outlined above. Patient  was given all follow up and return instructions. All questions and concerns were addressed at this time. Patient  expresses understanding of information and instructions, and is comfortable with plan.    Patient was instructed to follow up with her Primary Care Provider if no improvement in symptoms in 3-5 DAYS: or go to ED immediately for any worsening or change in current symptoms.  Treatment plan as well as options and alternatives reviewed and discussed with patient. All of the patients questions and concerns were addressed.The patient verbalized understanding and agrees with the discussed plan of care. Patient remained stable and was discharged in no acute distress.                 Patient Instructions   Flu Discharge Instructions  Please follow up with your PCP if there is any uncertainty as it pertains to medications you can take.   You have been diagnosed with Influenza. Which is viral in nature. Influenza may take 5-7 days to run its course.   You are contagious until you are fever free for 24 hours without any antipyretic medications such as tylenol or ibuprofen.   Please drink plenty of fluids.  Please get plenty of rest.    Tamiflu prescription has been discussed and if prescribed, please take to completion unless you cannot tolerate the side effects.   If not contraindicated, Take tylenol (acetominophen) for fever, chills or body aches every 4 hours. do not exceed 4000 mg/ day. Avoid tylenol (acetaminophen) if you  have any liver issues.  If not contraindicated, Take Motrin (Ibuprofen) every 4 hours for fever, chills, pain or inflammation. Avoid NSAIDS motrin(ibuprofen), aleve, naproxen ETC if you are taking blood thinners , have a history of GI bleeds or Kidney issues.   Use an antihistmine such as claritin or zyrtec to dry you out. Use pseudoephedrine (behind the counter) to decongest (beware this can raise your blood pressure). Use mucinex (guaifenisin) to break up mucus.  Promethazine DM as needed for cough. This medication may cause drowsiness so please avoid operating heavy machinery or driving while taking this medication.       Please arrange follow up with your primary medical clinic as soon as possible. You must understand that you've received an Urgent Care treatment only and that you may be released before all of your medical problems are known or treated. You, the  patient, will arrange for follow up as instructed. If your symptoms worsen or fail to improve you should go to the Emergency Room.

## 2024-12-20 NOTE — LETTER
December 20, 2024      [facility]  [address], [address]  [facility] [address]  Phone: [phone]  Fax: [phone]       Patient: Radha Desouza   YOB: 2000  Date of Visit: 12/20/2024    To Whom It May Concern:    Nadege Desouza  was at [facility] on 12/20/2024. The patient may return to work/school on 12/23/24 with no restrictions. If you have any questions or concerns, or if I can be of further assistance, please do not hesitate to contact me.    Sincerely,      Jesus Bee NP

## 2024-12-20 NOTE — PATIENT INSTRUCTIONS
Flu Discharge Instructions  Please follow up with your PCP if there is any uncertainty as it pertains to medications you can take.   You have been diagnosed with Influenza. Which is viral in nature. Influenza may take 5-7 days to run its course.   You are contagious until you are fever free for 24 hours without any antipyretic medications such as tylenol or ibuprofen.   Please drink plenty of fluids.  Please get plenty of rest.    Tamiflu prescription has been discussed and if prescribed, please take to completion unless you cannot tolerate the side effects.   If not contraindicated, Take tylenol (acetominophen) for fever, chills or body aches every 4 hours. do not exceed 4000 mg/ day. Avoid tylenol (acetaminophen) if you  have any liver issues.  If not contraindicated, Take Motrin (Ibuprofen) every 4 hours for fever, chills, pain or inflammation. Avoid NSAIDS motrin(ibuprofen), aleve, naproxen ETC if you are taking blood thinners , have a history of GI bleeds or Kidney issues.   Use an antihistmine such as claritin or zyrtec to dry you out. Use pseudoephedrine (behind the counter) to decongest (beware this can raise your blood pressure). Use mucinex (guaifenisin) to break up mucus.  Promethazine DM as needed for cough. This medication may cause drowsiness so please avoid operating heavy machinery or driving while taking this medication.       Please arrange follow up with your primary medical clinic as soon as possible. You must understand that you've received an Urgent Care treatment only and that you may be released before all of your medical problems are known or treated. You, the patient, will arrange for follow up as instructed. If your symptoms worsen or fail to improve you should go to the Emergency Room.

## 2025-02-28 ENCOUNTER — HOSPITAL ENCOUNTER (EMERGENCY)
Facility: HOSPITAL | Age: 25
Discharge: HOME | End: 2025-02-28
Attending: EMERGENCY MEDICINE
Payer: COMMERCIAL

## 2025-02-28 VITALS
HEART RATE: 72 BPM | HEIGHT: 62 IN | BODY MASS INDEX: 27.79 KG/M2 | SYSTOLIC BLOOD PRESSURE: 127 MMHG | TEMPERATURE: 99 F | WEIGHT: 151 LBS | DIASTOLIC BLOOD PRESSURE: 80 MMHG | RESPIRATION RATE: 18 BRPM | OXYGEN SATURATION: 98 %

## 2025-02-28 DIAGNOSIS — L02.91 ABSCESS: Primary | ICD-10-CM

## 2025-02-28 PROCEDURE — 10060 I&D ABSCESS SIMPLE/SINGLE: CPT

## 2025-02-28 PROCEDURE — 99282 EMERGENCY DEPT VISIT SF MDM: CPT | Mod: 25

## 2025-02-28 NOTE — ED PROVIDER NOTES
Encounter Date: 2/28/2025       History     Chief Complaint   Patient presents with    Lump     Pt c/o knot beneath her breast, present for one week.  Knot became painful today.  Pt has not had treatment for this complaint prior to today.     Patient complains of sternal abscess for several days.        Review of patient's allergies indicates:  No Known Allergies  No past medical history on file.  No past surgical history on file.  Family History   Problem Relation Name Age of Onset    No Known Problems Mother       Social History[1]  Review of Systems   Constitutional:  Negative for fever.   HENT:  Negative for sore throat.    Respiratory:  Negative for shortness of breath.    Cardiovascular:  Negative for chest pain.   Gastrointestinal:  Negative for nausea.   Genitourinary:  Negative for dysuria.   Musculoskeletal:  Negative for back pain.   Skin:  Negative for rash.   Neurological:  Negative for weakness.   Hematological:  Does not bruise/bleed easily.       Physical Exam     Initial Vitals [02/28/25 0829]   BP Pulse Resp Temp SpO2   124/69 73 16 98.8 °F (37.1 °C) 98 %      MAP       --         Physical Exam    Nursing note and vitals reviewed.  Constitutional: She appears well-developed and well-nourished. She is not diaphoretic. She is active.  Non-toxic appearance. No distress.   HENT:   Head: Normocephalic and atraumatic.   Eyes: Conjunctivae are normal. Right eye exhibits no discharge. Left eye exhibits no discharge. No scleral icterus.   Neck:   Normal range of motion.  Cardiovascular:  Normal rate, regular rhythm and intact distal pulses.           No murmur heard.  Pulmonary/Chest: Breath sounds normal. No respiratory distress. She has no wheezes.   Abdominal: She exhibits no distension.   Genitourinary:    Genitourinary Comments: Midline right breast there is a small area of fluctuance tenderness, no erythema to the surface.  OZZY jules     Musculoskeletal:         General: No tenderness.  Normal range of motion.      Cervical back: Normal range of motion.     Neurological: She is alert and oriented to person, place, and time. No cranial nerve deficit. GCS score is 15. GCS eye subscore is 4. GCS verbal subscore is 5. GCS motor subscore is 6.   Skin: Skin is warm and dry. Capillary refill takes less than 2 seconds. No rash noted.   Psychiatric: She has a normal mood and affect. Her behavior is normal. Judgment and thought content normal.         ED Course   I & D - Incision and Drainage    Date/Time: 2/28/2025 9:54 AM  Location procedure was performed: [facility] EMERGENCY DEPARTMENT    Performed by: Scott Diego NP  Authorized by: Scott Diego NP  Type: abscess  Location: Sternum right side at breast.  Anesthesia: local infiltration    Anesthesia:  Local Anesthetic: lidocaine 1% without epinephrine  Scalpel size: 11  Incision type: single straight  Incision depth: dermal  Complexity: simple  Drainage: pus  Drainage amount: scant  Wound treatment: incision, drainage and deloculation  Packing material: 1/4 in gauze  Complications: No  Specimens: No  Implants: No  Comments: OZZY jules    Incision depth: dermal        Labs Reviewed   HEPATITIS C ANTIBODY   HEP C VIRUS HOLD SPECIMEN   HIV 1 / 2 ANTIBODY          Imaging Results    None          Medications - No data to display  Medical Decision Making  Differential diagnosis considered but not limited to; breast abscess, sebaceous cyst, cellulitis                                      Clinical Impression:  Final diagnoses:  [L02.91] Abscess (Primary)          ED Disposition Condition    Discharge Stable          ED Prescriptions    None       Follow-up Information       Follow up With Specialties Details Why Contact Info    [facility] Emergency Dept. Emergency Medicine Go to  If symptoms worsen [address] [facility] [address] [phone]               [1]   Social History  Tobacco Use    Smoking status: Never     Smokeless tobacco: Never   Substance Use Topics    Alcohol use: Not Currently    Drug use: Never        Scott Diego NP  02/28/25 0969

## 2025-02-28 NOTE — Clinical Note
"Radha "Mikayla Desouza was seen and treated in our emergency department on 2/28/2025.  She may return to work on 03/01/2025.       If you have any questions or concerns, please don't hesitate to call.      Scott Diego, KAR"

## 2025-05-26 ENCOUNTER — OFFICE VISIT (OUTPATIENT)
Dept: OBSTETRICS AND GYNECOLOGY | Facility: CLINIC | Age: 25
End: 2025-05-26
Payer: COMMERCIAL

## 2025-05-26 VITALS
DIASTOLIC BLOOD PRESSURE: 90 MMHG | HEIGHT: 62 IN | BODY MASS INDEX: 26.69 KG/M2 | SYSTOLIC BLOOD PRESSURE: 130 MMHG | WEIGHT: 145.06 LBS

## 2025-05-26 DIAGNOSIS — Z12.4 SCREENING FOR CERVICAL CANCER: ICD-10-CM

## 2025-05-26 DIAGNOSIS — Z01.419 ENCOUNTER FOR GYNECOLOGICAL EXAMINATION (GENERAL) (ROUTINE) WITHOUT ABNORMAL FINDINGS: Primary | ICD-10-CM

## 2025-05-26 DIAGNOSIS — Z72.51 HIGH RISK HETEROSEXUAL BEHAVIOR: ICD-10-CM

## 2025-05-26 DIAGNOSIS — N76.4 VULVAR ABSCESS: ICD-10-CM

## 2025-05-26 PROCEDURE — 88175 CYTOPATH C/V AUTO FLUID REDO: CPT | Mod: TC | Performed by: OBSTETRICS & GYNECOLOGY

## 2025-05-26 PROCEDURE — 99999 PR PBB SHADOW E&M-EST. PATIENT-LVL III: CPT | Mod: PBBFAC,,, | Performed by: OBSTETRICS & GYNECOLOGY

## 2025-05-26 PROCEDURE — 99395 PREV VISIT EST AGE 18-39: CPT | Mod: S$GLB,,, | Performed by: OBSTETRICS & GYNECOLOGY

## 2025-05-26 PROCEDURE — 87491 CHLMYD TRACH DNA AMP PROBE: CPT | Performed by: OBSTETRICS & GYNECOLOGY

## 2025-05-26 RX ORDER — SULFAMETHOXAZOLE AND TRIMETHOPRIM 800; 160 MG/1; MG/1
1 TABLET ORAL 2 TIMES DAILY
Qty: 14 TABLET | Refills: 0 | Status: SHIPPED | OUTPATIENT
Start: 2025-05-26 | End: 2025-06-02

## 2025-05-26 NOTE — PROGRESS NOTES
Subjective:       Patient ID: Radha Desouza is a 24 y.o. female.    Chief Complaint:  Annual Exam      History of Present Illness  HPI  Annual Exam-Premenopausal  Patient presents for annual exam. The patient c/o ingrown hairs--waxes perineum but reports no recent waxing;  The patient is sexually active.condoms, 2 partners GYN screening history: last pap: approximate date 2023 and was normal. The patient wears seatbelts: yes. The patient participates in regular exercise: yes.--cardio 2d/wk;  Has the patient ever been transfused or tattooed?: yes. --+tattooes; The patient reports that there is not domestic violence in her life.  Menses monthly, flow 7 days; super tampon/super pad; change q 2 hr (want to); can soak onto pad; denies dysmenorrhea;   No problems sleeping    GYN & OB History  Patient's last menstrual period was 2025 (approximate).   Date of Last Pap: 2023    OB History    Para Term  AB Living   0 0 0 0 0 0   SAB IAB Ectopic Multiple Live Births   0 0 0 0 0       Review of Systems  Review of Systems   Genitourinary:  Positive for vaginal pain.   All other systems reviewed and are negative.          Objective:      Physical Exam:   Constitutional: She is oriented to person, place, and time. She appears well-developed and well-nourished.     Eyes: Pupils are equal, round, and reactive to light. Conjunctivae and EOM are normal.      Pulmonary/Chest: Effort normal. Right breast exhibits no mass, no nipple discharge, no skin change and no tenderness. Left breast exhibits no mass, no nipple discharge, no skin change and no tenderness. Breasts are symmetrical.        Abdominal: Soft.     Genitourinary:    Inguinal canal, urethra, bladder, vagina, uterus, right adnexa, left adnexa and rectum normal.            Pelvic exam was performed with patient supine.   The external female genitalia was normal.     Labial bartholins normal.Cervix is normal. Right adnexum displays no mass and no  tenderness. Left adnexum displays no mass and no tenderness. No erythema, vaginal discharge, bleeding, rectocele, cystocele or prolapse of vaginal walls in the vagina. Vagina was moist.Cervix exhibits no motion tenderness and no friability.    pap smear completedUerus contour normal  Normal urethral meatus.Urethra findings: no urethral massBladder findings: no bladder distention and no bladder tenderness          Musculoskeletal: Normal range of motion and moves all extremeties.       Neurological: She is alert and oriented to person, place, and time.    Skin: Skin is warm.    Psychiatric: She has a normal mood and affect. Her behavior is normal.           Assessment:        1. Encounter for gynecological examination (general) (routine) without abnormal findings    2. Screening for cervical cancer    3. High risk heterosexual behavior    4. Vulvar abscess               Plan:      Continue annual well woman exam.  Pap 2023 due in 2026; Reviewed updated recommendations for pap smears (every 3 years) in low risk patients.   Recommend annual pelvic exams.  Reviewed recommendations for annual CBE.  Pt prefers pap taken  Gc/ct today  Safe sex  Aware of plan b 1  Gentle skin cleansing, rx sent for septra  Continue diet, exercise, weight loss

## 2025-05-28 LAB
C TRACH DNA SPEC QL NAA+PROBE: NOT DETECTED
CTGC SOURCE (OHS) ORD-325: NORMAL
N GONORRHOEA DNA UR QL NAA+PROBE: NOT DETECTED

## 2025-05-29 ENCOUNTER — RESULTS FOLLOW-UP (OUTPATIENT)
Dept: OBSTETRICS AND GYNECOLOGY | Facility: CLINIC | Age: 25
End: 2025-05-29

## 2025-05-30 LAB
INSULIN SERPL-ACNC: ABNORMAL U[IU]/ML
LAB AP BETHESDA CATEGORY: ABNORMAL
LAB AP CLINICAL FINDINGS: ABNORMAL
LAB AP COMMENTS: ABNORMAL
LAB AP CONTRACEPTIVES: ABNORMAL
LAB AP LMP DATE: ABNORMAL
LAB AP OCHS PAP SPECIMEN ADEQUACY: ABNORMAL
LAB AP OHS PAP INTERPRETATION: ABNORMAL
LAB AP PAP DISCLAIMER COMMENTS: ABNORMAL
LAB AP PERFORMING LOCATION(S): ABNORMAL

## 2025-05-31 ENCOUNTER — OFFICE VISIT (OUTPATIENT)
Dept: URGENT CARE | Facility: CLINIC | Age: 25
End: 2025-05-31
Payer: COMMERCIAL

## 2025-05-31 ENCOUNTER — HOSPITAL ENCOUNTER (OUTPATIENT)
Dept: RADIOLOGY | Facility: CLINIC | Age: 25
Discharge: HOME | End: 2025-05-31
Attending: FAMILY MEDICINE
Payer: COMMERCIAL

## 2025-05-31 VITALS
WEIGHT: 143 LBS | HEART RATE: 87 BPM | HEIGHT: 62 IN | OXYGEN SATURATION: 95 % | BODY MASS INDEX: 26.31 KG/M2 | SYSTOLIC BLOOD PRESSURE: 139 MMHG | TEMPERATURE: 98 F | RESPIRATION RATE: 18 BRPM | DIASTOLIC BLOOD PRESSURE: 88 MMHG

## 2025-05-31 DIAGNOSIS — R05.9 COUGH, UNSPECIFIED TYPE: Primary | ICD-10-CM

## 2025-05-31 DIAGNOSIS — J40 BRONCHITIS: ICD-10-CM

## 2025-05-31 DIAGNOSIS — J22 ACUTE LOWER RESPIRATORY INFECTION: ICD-10-CM

## 2025-05-31 DIAGNOSIS — R09.89 CHEST CONGESTION: ICD-10-CM

## 2025-05-31 DIAGNOSIS — R05.9 COUGH, UNSPECIFIED TYPE: ICD-10-CM

## 2025-05-31 LAB
CTP QC/QA: YES
CTP QC/QA: YES
POC MOLECULAR INFLUENZA A AGN: NEGATIVE
POC MOLECULAR INFLUENZA B AGN: NEGATIVE
SARS-COV+SARS-COV-2 AG RESP QL IA.RAPID: NEGATIVE

## 2025-05-31 PROCEDURE — 71046 X-RAY EXAM CHEST 2 VIEWS: CPT | Mod: S$GLB,,, | Performed by: RADIOLOGY

## 2025-05-31 PROCEDURE — 94640 AIRWAY INHALATION TREATMENT: CPT | Mod: S$GLB,,, | Performed by: FAMILY MEDICINE

## 2025-05-31 PROCEDURE — 99214 OFFICE O/P EST MOD 30 MIN: CPT | Mod: 25,S$GLB,, | Performed by: FAMILY MEDICINE

## 2025-05-31 PROCEDURE — 87811 SARS-COV-2 COVID19 W/OPTIC: CPT | Mod: QW,S$GLB,, | Performed by: FAMILY MEDICINE

## 2025-05-31 PROCEDURE — 87502 INFLUENZA DNA AMP PROBE: CPT | Mod: QW,S$GLB,, | Performed by: FAMILY MEDICINE

## 2025-05-31 RX ORDER — PROMETHAZINE HYDROCHLORIDE AND DEXTROMETHORPHAN HYDROBROMIDE 6.25; 15 MG/5ML; MG/5ML
5 SYRUP ORAL EVERY 6 HOURS PRN
Qty: 150 ML | Refills: 0 | Status: SHIPPED | OUTPATIENT
Start: 2025-05-31 | End: 2025-05-31

## 2025-05-31 RX ORDER — AZITHROMYCIN 250 MG/1
TABLET, FILM COATED ORAL
Qty: 6 TABLET | Refills: 0 | Status: SHIPPED | OUTPATIENT
Start: 2025-05-31 | End: 2025-05-31

## 2025-05-31 RX ORDER — ALBUTEROL SULFATE 90 UG/1
2 INHALANT RESPIRATORY (INHALATION) EVERY 4 HOURS PRN
Qty: 18 G | Refills: 0 | Status: SHIPPED | OUTPATIENT
Start: 2025-05-31

## 2025-05-31 RX ORDER — ALBUTEROL SULFATE 90 UG/1
2 INHALANT RESPIRATORY (INHALATION) EVERY 4 HOURS PRN
Qty: 18 G | Refills: 0 | Status: SHIPPED | OUTPATIENT
Start: 2025-05-31 | End: 2025-05-31

## 2025-05-31 RX ORDER — IPRATROPIUM BROMIDE 0.5 MG/2.5ML
0.5 SOLUTION RESPIRATORY (INHALATION)
Status: COMPLETED | OUTPATIENT
Start: 2025-05-31 | End: 2025-05-31

## 2025-05-31 RX ORDER — PROMETHAZINE HYDROCHLORIDE AND DEXTROMETHORPHAN HYDROBROMIDE 6.25; 15 MG/5ML; MG/5ML
5 SYRUP ORAL EVERY 6 HOURS PRN
Qty: 150 ML | Refills: 0 | Status: SHIPPED | OUTPATIENT
Start: 2025-05-31

## 2025-05-31 RX ORDER — PREDNISONE 20 MG/1
20 TABLET ORAL 2 TIMES DAILY
Qty: 6 TABLET | Refills: 0 | Status: SHIPPED | OUTPATIENT
Start: 2025-05-31 | End: 2025-06-03

## 2025-05-31 RX ORDER — ALBUTEROL SULFATE 0.83 MG/ML
2.5 SOLUTION RESPIRATORY (INHALATION)
Status: COMPLETED | OUTPATIENT
Start: 2025-05-31 | End: 2025-05-31

## 2025-05-31 RX ORDER — PREDNISONE 20 MG/1
20 TABLET ORAL 2 TIMES DAILY
Qty: 6 TABLET | Refills: 0 | Status: SHIPPED | OUTPATIENT
Start: 2025-05-31 | End: 2025-05-31

## 2025-05-31 RX ORDER — AZITHROMYCIN 250 MG/1
TABLET, FILM COATED ORAL
Qty: 6 TABLET | Refills: 0 | Status: SHIPPED | OUTPATIENT
Start: 2025-05-31

## 2025-05-31 RX ADMIN — IPRATROPIUM BROMIDE 0.5 MG: 0.5 SOLUTION RESPIRATORY (INHALATION) at 10:05

## 2025-05-31 RX ADMIN — ALBUTEROL SULFATE 2.5 MG: 0.83 SOLUTION RESPIRATORY (INHALATION) at 10:05

## 2025-06-02 NOTE — PROGRESS NOTES
Please advise pt that her pap is mildly abnormal (ascus-h) Please schedule pt a colpo procedure (a procedure where dr jordan looks at cervix through the microscope); remind pt to premedicate with nsaid prior to appt and that she cannot be on menses for the procedure

## 2025-07-02 ENCOUNTER — TELEPHONE (OUTPATIENT)
Dept: OBSTETRICS AND GYNECOLOGY | Facility: CLINIC | Age: 25
End: 2025-07-02
Payer: COMMERCIAL

## 2025-07-02 NOTE — TELEPHONE ENCOUNTER
Copied from CRM #[id number]. Topic: Appointments - Appointment Rescheduling  >> Jul 2, 2025  2:42 PM Zena wrote:  .Type: Appt Access       Who called:   patient     What is the request in detail:  called in to get procedure rescheduled . Please call back    Can the clinic reply by [facility]?           Would the patient rather a call back or a response via [facility]?   call     Best call back number:  [phone]

## 2025-07-02 NOTE — TELEPHONE ENCOUNTER
Called patient and rescheduled colpo.  Patient reminded she cannot be on cycle for procedure and she verbalized understanding.

## 2025-07-14 ENCOUNTER — PROCEDURE VISIT (OUTPATIENT)
Dept: OBSTETRICS AND GYNECOLOGY | Facility: CLINIC | Age: 25
End: 2025-07-14
Payer: COMMERCIAL

## 2025-07-14 VITALS
DIASTOLIC BLOOD PRESSURE: 58 MMHG | SYSTOLIC BLOOD PRESSURE: 120 MMHG | BODY MASS INDEX: 27.06 KG/M2 | HEIGHT: 62 IN | WEIGHT: 147.06 LBS

## 2025-07-14 DIAGNOSIS — R87.613 HSIL ON PAP SMEAR OF CERVIX: ICD-10-CM

## 2025-07-14 DIAGNOSIS — L73.2 HIDRADENITIS SUPPURATIVA OF MULTIPLE SITES: Primary | ICD-10-CM

## 2025-07-14 DIAGNOSIS — R87.619 ABNORMAL CERVICAL PAPANICOLAOU SMEAR, UNSPECIFIED ABNORMAL PAP FINDING: ICD-10-CM

## 2025-07-14 LAB
B-HCG UR QL: NEGATIVE
CTP QC/QA: YES

## 2025-07-14 PROCEDURE — 88305 TISSUE EXAM BY PATHOLOGIST: CPT | Mod: TC,91 | Performed by: OBSTETRICS & GYNECOLOGY

## 2025-07-14 PROCEDURE — 57454 BX/CURETT OF CERVIX W/SCOPE: CPT | Mod: S$GLB,,, | Performed by: OBSTETRICS & GYNECOLOGY

## 2025-07-14 PROCEDURE — 81025 URINE PREGNANCY TEST: CPT | Mod: S$GLB,,, | Performed by: OBSTETRICS & GYNECOLOGY

## 2025-07-14 NOTE — PROCEDURES
Colposcopy    Date/Time: 7/14/2025 1:30 PM    Performed by: Caroline Casanova MD  Authorized by: Caroline Casanova MD    Consent obatined:  Prior to procedure the appropriate consent was completed and verified  Timeout:Immediately prior to procedure a time out was called to verify the correct patient, procedure, equipment, support staff and site/side marked as required  Prep:Patient was prepped and draped in the usual sterile fashion    Colposcopy Site:  Cervix  Position:  Supine   Patient was prepped and draped in the normal sterile fashion.  Acrowhite Lesion? Yes    Atypical Vessels: No    Transformation Zone Adequate?: Yes    Biopsy?: Yes         Location:  Cervix ((11 00))  ECC Performed?: Yes    LEEP Performed?: No    Estimated blood loss (cc):  5   Patient tolerated the procedure well with no immediate complications.   Post-operative instructions were provided for the patient.   Patient was discharged and will follow up if any complications occur

## 2025-07-17 ENCOUNTER — OFFICE VISIT (OUTPATIENT)
Dept: DERMATOLOGY | Facility: CLINIC | Age: 25
End: 2025-07-17
Payer: COMMERCIAL

## 2025-07-17 DIAGNOSIS — L73.2 HIDRADENITIS SUPPURATIVA: ICD-10-CM

## 2025-07-17 LAB
ESTROGEN SERPL-MCNC: NORMAL PG/ML
INSULIN SERPL-ACNC: NORMAL U[IU]/ML
LAB AP CLINICAL INFORMATION: NORMAL
LAB AP DIAGNOSIS CATEGORY: NORMAL
LAB AP GROSS DESCRIPTION: NORMAL
LAB AP PERFORMING LOCATION(S): NORMAL
LAB AP REPORT FOOTNOTES: NORMAL

## 2025-07-17 PROCEDURE — G2211 COMPLEX E/M VISIT ADD ON: HCPCS | Mod: S$GLB,,, | Performed by: STUDENT IN AN ORGANIZED HEALTH CARE EDUCATION/TRAINING PROGRAM

## 2025-07-17 PROCEDURE — 99204 OFFICE O/P NEW MOD 45 MIN: CPT | Mod: S$GLB,,, | Performed by: STUDENT IN AN ORGANIZED HEALTH CARE EDUCATION/TRAINING PROGRAM

## 2025-07-17 PROCEDURE — 99999 PR PBB SHADOW E&M-EST. PATIENT-LVL III: CPT | Mod: PBBFAC,,, | Performed by: STUDENT IN AN ORGANIZED HEALTH CARE EDUCATION/TRAINING PROGRAM

## 2025-07-17 RX ORDER — SULFAMETHOXAZOLE AND TRIMETHOPRIM 800; 160 MG/1; MG/1
1 TABLET ORAL 2 TIMES DAILY
COMMUNITY
Start: 2025-07-16

## 2025-07-17 RX ORDER — DOXYCYCLINE HYCLATE 100 MG
TABLET ORAL
Qty: 30 TABLET | Refills: 0 | Status: SHIPPED | OUTPATIENT
Start: 2025-07-17

## 2025-07-17 NOTE — PATIENT INSTRUCTIONS
Hidradenitis Suppurativa (HS) is follicular hyperkeratosis and obstruction with secondary apocrine gland involvement.    Gasca stage I - >/= 1 abscesses with no sinus tracts or scarring  Gasca stage II - >/1 widely  recurrent abscesses with a tract and scarring  Gasca stage III - multiple interconnected tracts and abscesses throughout entire affected area    You are stage I    Comorbidities to be aware of :  Obesity 50 - 75% of HS patients are obese; weight loss can decrease risk for disease progression  2.   Smoking  *obesity and smoking are independent risk factors for HS    HS can be associated with:  Metabolic syndrome (Hypertension, high Triglycerides, low HDL, high fasting glucose)  Depression 30% in adults and 12% in kids (vs 17% and 4% in controls, respectively)    Resources:  For patients: Hidradenitis suppurativa foundation and [employer]ermat[employer] websites    TREATMENT REGIMEN:   ENVIRONMENTAL:  Dilute bleach   Recipe for dilute bleach baths: add 1/2 cup of regular strength (6%) bleach to a full tub of lukewarm water and soak for 10 - 15 minutes. (use 1/4 cup for a half-full tub of water).  A bleach bath kills bacteria, reduces inflammation, and moisturizes your skin all in the same treatment.  Recommended to use dilute bleach baths 2x/week  Recipe for dilute bleach compress: 1 - 1.5 tsp of regular strength (6%) bleach in 1 gallon of water  dilute bleach spray Veterycin ([employer]). This is sold for animal use.   An alternative to dilute bleach baths, compresses, and sprays is a dilute bleach wash (CLn body wash) - can get on [employer]  Hibiclens wash and /or (can alternate) benzoyl peroxide wash or to affected areas daily  Discontinue smoking  Weight loss: You want a Low glycemic index diet - look up sugar busters diet; also dairy and   50 - 75% of HS pts are obese  wt loss can decrease risk for disease progression  obesity also leads to increased friction which exacerbates disease  Keep  food journal  3 foods that may worsen HS symptoms:  Dairy products:  Milk, cheese and other dairy products can raise insulin levels. This leads to the overproduction of hormones called androgens, which play a role in HS. Removing dairy from your diet might reduce the number of new lesions that develop and lessen symptoms of HS.   2.   Odom's yeast (helps bakery products to rise)  Found in some breads, pizza dough, cake, beer, wine and fermented cheeses- eliminating can promote the healing of HS lesions        3.  Sugar (added sugars and syrups)  Avoid simple carbohydrates (white foods) and foods that are high in added sugars   Eliminating sodas, cereals and candy can decrease insulin levels and east the symptoms of HS  Wear loose fitting clothing - friction exacerbates disease  Try boy short or boxer underwear  Stop shaving where you have breakouts - friction exacerbates disease; Can consider laser hair removal  Keep skin cool - overheating and sweating can flare HS  Warm/hot compress for home use on a painful deep lump  Hydrogen Peroxide and medihoney can decrease odor if have open/draining lesion    SUPPLEMENTS (anti-inflammatory):  Turmeric - start with 500mg every day and increase to 1 g every day (may cause GI upset)- 100x more anti-inflammatory if mixed with black pepper or with fatty food  NicAzel Forte - dosed qday. (FDA approved supplement for tx of acne and rosacea). It is combination of Zinc, Cu++, folic acid and niacinamide. ([facility] approx. $40). Take 1 - 2 tablets 1 - 2 x/day. Can take with or without food  Most common side effects of zinc: stomach upset, diarrhea, nausea and vomiting  V8 juice - (contains zinc and Vit C) - 3 small cans/week    TOPICALS/IL:  ILK 10 or 5 for actively inflamed lesions   Clindamycin solution (roll on or can put in spray bottle) - Use on affected areas 1 - 2x/day      ORAL ABX (to jumpstart remission or use intermittently for flares):  Keflex  500mg 2x/day x 1 month  Doxy 100mg 2x/day or MCN or TCN 500mg 2x/day + Dapsone x 3 months  Clinda 300mg 2x/day and Rifampin 300mg 2x/day x 12 weeks (can substitute Clinda with Doxy or with Cipro and flagyl)   Clinda - watch for diarrhea  Rifampin - will make secretions (ie tears, sweat, urine) orange; decreases effectiveness of OCP; watch for drug-drug interactions; decreases risk of C. Diff; also decreases effectiveness of birth control (oral and transdermal); need to use secondary form of birth control while taking Rifampin and for at least 1 month following last dose of Rifampin.   For acute abscesses (often super-infected): Doxy 100mg 2x/day or Bactrim 2x/day or Dicloxacillen     HORMONAL:  OCP for women - response takes 6+ months   Spironolactone for women  Finasteride 5mg qday/Dutasteride for men    OTHER SYSTEMIC OPTIONS:  Trental 400mg 3x/day   Accutane   Best used adjunctively with Humira  Colchicine    ADDITIONAL TX BASED ON COMORBIDITY:  If have diabetes: Metformin:   Dose 500 XR with dinner then increase to 1000 mg  Dose at night with food 2/2 GI stress, diarrhea, weight loss.   No labs needed  if renal disease or diabetes (endo or internist should be rxing then). check a bmp in 6 weeks (acidosis)  long term will decrease # and severity of lesions  If hyperhidrosis: Robinul - sweat can flare HS; want to keep skin cool  Laser hair removal - long pulse ND:Yag    BIOLOGICS (can add MTX or Dapsone or pred - to help with neutralizing abs):  Humira 40mg SQ weekly or 80mg SQ every other week after load (can add abx or Accutane) then  Stelara then  Remicade dose 1 and 2 at 7.5mg/kg then increase to 10mg/kg q 6 weeks)   Check drug level and Ab formation just prior to infusion  then  Otezla at 30mg 2x/day - decreases lesion count and pain and itch but not derm QOL AAD 2019; 80:80-8  Trials with Tremfya and Cosentyx    LASER/LIGHT/SURGERY:    ND:Yag - long pulse q month x 4 with fluences up to 70J/cm2 - 65%  improvement  Alexandrite - long pulse q month x 2 - no recurrence at 1 year (n = 1)  CO2 laser ablation  Unroofing/soft-tissue ablation for localized disease - (stage I) - recurs 25 - 30%  Wide excision (stage II/III) - recurs 15 - 20% - Brianna Domingo ([facility] surgeon interested in HS: [phone])  PDT - incubate with Levulan 45 minutes *reference - DUSA  4 pts: 75% clearance after 3 - 10 treatments with maint tx q 3 months. Still clear at 2 years  Cryo (B for evidence)  I and D - if very painful/pointing stage; recurs 100%  Radiation tx

## 2025-07-17 NOTE — PROGRESS NOTES
Patient Information  Name: Radha Desouza  : 2000  MRN: [id number]     Referring Physician:  Dr. Casanova   Primary Care Physician:  Dr. Montano, Primary Doctor   Date of Visit: 2025      Subjective:       Radha Desouza is a 24 y.o. female who presents for   Chief Complaint   Patient presents with    Hidradenitis Suppurativa     Patient present today to discuss treatment. Patient is currently treating with Bactrim DS, with some improvement.   Patient states she recently had a lesion drained on her left breast and now has a string hanging out.      Patient is here with concern of: skin lesions  Location: breast, groin  Duration: years  Symptoms: drainage  Prior treatments: currently given Bactrim by urgent care    Patient was last seen:Visit date not found     Prior notes by myself reviewed.   Clinical documentation obtained by nursing staff reviewed.    Review of Systems   Skin:  Negative for itching and rash.        Objective:    Physical Exam   Constitutional: She appears well-developed and well-nourished. No distress.   Neurological: She is alert and oriented to person, place, and time. She is not disoriented.   Psychiatric: She has a normal mood and affect.   Skin:   Areas Examined (abnormalities noted in diagram):   Head / Face Inspection Performed  Neck Inspection Performed  Chest / Axilla Inspection Performed              Diagram Legend     Erythematous scaling macule/papule c/w actinic keratosis       Vascular papule c/w angioma      Pigmented verrucoid papule/plaque c/w seborrheic keratosis      Yellow umbilicated papule c/w sebaceous hyperplasia      Irregularly shaped tan macule c/w lentigo     1-2 mm smooth white papules consistent with Milia      Movable subcutaneous cyst with punctum c/w epidermal inclusion cyst      Subcutaneous movable cyst c/w pilar cyst      Firm pink to brown papule c/w dermatofibroma      Pedunculated fleshy papule(s) c/w skin tag(s)      Evenly pigmented macule c/w  junctional nevus     Mildly variegated pigmented, slightly irregular-bordered macule c/w mildly atypical nevus      Flesh colored to evenly pigmented papule c/w intradermal nevus       Pink pearly papule/plaque c/w basal cell carcinoma      Erythematous hyperkeratotic cursted plaque c/w SCC      Surgical scar with no sign of skin cancer recurrence      Open and closed comedones      Inflammatory papules and pustules      Verrucoid papule consistent consistent with wart     Erythematous eczematous patches and plaques     Dystrophic onycholytic nail with subungual debris c/w onychomycosis     Umbilicated papule    Erythematous-base heme-crusted tan verrucoid plaque consistent with inflamed seborrheic keratosis     Erythematous Silvery Scaling Plaque c/w Psoriasis     See annotation      No images are attached to the encounter or orders placed in the encounter.    [] Data reviewed  [] Independent review of test  [] Management discussed with another provider    Assessment / Plan:        Hidradenitis suppurativa, Gasca stage I  -     doxycycline (VIBRA-TABS) 100 MG tablet; Take 1 po BID x 5 days with food and not within 1 hour prior to lying down  Dispense: 30 tablet; Refill: 0  - patient recently had an I and D for an abscess on her chest and currently has a packing tape in place.  We will go ahead and remove packing tape today.  Discussed etiology and treatment options with the patient.    Discussed benefits and risks of doxycyline therapy including but not limited to GI discomfort, esophageal irritation/ulceration, and increased sun sensitivity. Patient was counseled to take medicine with meals and at least 1 hour before lying down.              LOS NUMBER AND COMPLEXITY OF PROBLEMS    COMPLEXITY OF DATA RISK TOTAL TIME (m)   83430  54800 [] 1 self-limited or minor problem [x] Minimal to none [] No treatment recommended or patient to monitor 15-29  10-19   98323  53169 Low  [] 2 or > self limited or minor  problems  [] 1 stable chronic illness  [] 1 acute, uncomplicated illness or injury Limited (2)  [] Prior external notes from each unique source  [] Review result of each unique test  [] Order each unique test []  Low  OTC medications, minor skin biopsy 30-44  20-29   97513  09012 Moderate  [x]  1 or > chronic illness with progression, exacerbation or SE of treatment  []  2 or more stable chronic illnesses  []  1 acute illness with systemic symptoms  []  1 acute complicated injury  []  1 undiagnosed new problem with uncertain prognosis Moderate (1/3 below)  []  3 or more data items        *Now includes assessment requiring independent historian  []  Independent interpretation of a test  []  Discuss management/test with another provider Moderate  [x]  Prescription drug mgmt  []  Minor surgery with risk discussed  []  Mgmt limited by social determinates 45-59  30-39   50694  13446 High  []  1 or more chronic illness with severe exacerbation, progression or SE of treatment  []  1 acute or chronic illness/injury that poses a threat to life or bodily function Extensive (2/3 below)  []  3 or more data items        *Now includes assessment requiring independent historian.  []  Independent interpretation of a test  []  Discuss management/test with another provider High  []  Major surgery with risk discussed  []  Drug therapy requiring intensive monitoring for toxicity  []  Hospitalization  []  Decision for DNR 60-74  40-54      No follow-ups on file.    Myriam Hwang MD, FAAD  [facility]